# Patient Record
Sex: FEMALE | Race: WHITE | Employment: FULL TIME | URBAN - METROPOLITAN AREA
[De-identification: names, ages, dates, MRNs, and addresses within clinical notes are randomized per-mention and may not be internally consistent; named-entity substitution may affect disease eponyms.]

---

## 2017-01-19 ENCOUNTER — OFFICE VISIT (OUTPATIENT)
Dept: FAMILY MEDICINE CLINIC | Facility: CLINIC | Age: 57
End: 2017-01-19

## 2017-01-19 ENCOUNTER — LAB ENCOUNTER (OUTPATIENT)
Dept: LAB | Age: 57
End: 2017-01-19
Attending: FAMILY MEDICINE
Payer: COMMERCIAL

## 2017-01-19 VITALS
TEMPERATURE: 99 F | HEIGHT: 64 IN | SYSTOLIC BLOOD PRESSURE: 112 MMHG | WEIGHT: 166 LBS | HEART RATE: 64 BPM | DIASTOLIC BLOOD PRESSURE: 66 MMHG | BODY MASS INDEX: 28.34 KG/M2 | RESPIRATION RATE: 18 BRPM

## 2017-01-19 DIAGNOSIS — Z01.419 WELL WOMAN EXAM WITH ROUTINE GYNECOLOGICAL EXAM: Primary | ICD-10-CM

## 2017-01-19 DIAGNOSIS — Z12.11 SCREENING FOR COLON CANCER: ICD-10-CM

## 2017-01-19 DIAGNOSIS — Z12.4 SCREENING FOR MALIGNANT NEOPLASM OF CERVIX: ICD-10-CM

## 2017-01-19 DIAGNOSIS — L98.9 FACIAL LESION: ICD-10-CM

## 2017-01-19 DIAGNOSIS — Z12.31 VISIT FOR SCREENING MAMMOGRAM: ICD-10-CM

## 2017-01-19 LAB
25-HYDROXYVITAMIN D (TOTAL): 23.7 NG/ML (ref 30–100)
ALBUMIN SERPL-MCNC: 3.8 G/DL (ref 3.5–4.8)
ALP LIVER SERPL-CCNC: 66 U/L (ref 46–118)
ALT SERPL-CCNC: 25 U/L (ref 14–54)
AST SERPL-CCNC: 16 U/L (ref 15–41)
BASOPHILS # BLD AUTO: 0.03 X10(3) UL (ref 0–0.1)
BASOPHILS NFR BLD AUTO: 0.4 %
BILIRUB SERPL-MCNC: 0.6 MG/DL (ref 0.1–2)
BILIRUB UR QL STRIP.AUTO: NEGATIVE
BUN BLD-MCNC: 17 MG/DL (ref 8–20)
CALCIUM BLD-MCNC: 9.2 MG/DL (ref 8.3–10.3)
CHLORIDE: 107 MMOL/L (ref 101–111)
CHOLEST SMN-MCNC: 205 MG/DL (ref ?–200)
CLARITY UR REFRACT.AUTO: CLEAR
CO2: 27 MMOL/L (ref 22–32)
COLOR UR AUTO: YELLOW
CREAT BLD-MCNC: 0.59 MG/DL (ref 0.55–1.02)
EOSINOPHIL # BLD AUTO: 0.19 X10(3) UL (ref 0–0.3)
EOSINOPHIL NFR BLD AUTO: 2.5 %
ERYTHROCYTE [DISTWIDTH] IN BLOOD BY AUTOMATED COUNT: 12 % (ref 11.5–16)
GLUCOSE BLD-MCNC: 89 MG/DL (ref 70–99)
GLUCOSE UR STRIP.AUTO-MCNC: NEGATIVE MG/DL
HCT VFR BLD AUTO: 40.6 % (ref 34–50)
HDLC SERPL-MCNC: 86 MG/DL (ref 45–?)
HDLC SERPL: 2.38 {RATIO} (ref ?–4.44)
HGB BLD-MCNC: 13.5 G/DL (ref 12–16)
IMMATURE GRANULOCYTE COUNT: 0.02 X10(3) UL (ref 0–1)
IMMATURE GRANULOCYTE RATIO %: 0.3 %
KETONES UR STRIP.AUTO-MCNC: NEGATIVE MG/DL
LDLC SERPL CALC-MCNC: 101 MG/DL (ref ?–130)
LEUKOCYTE ESTERASE UR QL STRIP.AUTO: NEGATIVE
LYMPHOCYTES # BLD AUTO: 2.17 X10(3) UL (ref 0.9–4)
LYMPHOCYTES NFR BLD AUTO: 29.1 %
M PROTEIN MFR SERPL ELPH: 7.6 G/DL (ref 6.1–8.3)
MCH RBC QN AUTO: 31.3 PG (ref 27–33.2)
MCHC RBC AUTO-ENTMCNC: 33.3 G/DL (ref 31–37)
MCV RBC AUTO: 94.2 FL (ref 81–100)
MONOCYTES # BLD AUTO: 0.67 X10(3) UL (ref 0.1–0.6)
MONOCYTES NFR BLD AUTO: 9 %
NEUTROPHIL ABS PRELIM: 4.38 X10 (3) UL (ref 1.3–6.7)
NEUTROPHILS # BLD AUTO: 4.38 X10(3) UL (ref 1.3–6.7)
NEUTROPHILS NFR BLD AUTO: 58.7 %
NITRITE UR QL STRIP.AUTO: NEGATIVE
NONHDLC SERPL-MCNC: 119 MG/DL (ref ?–130)
PH UR STRIP.AUTO: 6 [PH] (ref 4.5–8)
PLATELET # BLD AUTO: 227 10(3)UL (ref 150–450)
POTASSIUM SERPL-SCNC: 4.2 MMOL/L (ref 3.6–5.1)
PROT UR STRIP.AUTO-MCNC: NEGATIVE MG/DL
RBC # BLD AUTO: 4.31 X10(6)UL (ref 3.8–5.1)
RBC UR QL AUTO: NEGATIVE
RED CELL DISTRIBUTION WIDTH-SD: 42.2 FL (ref 35.1–46.3)
SODIUM SERPL-SCNC: 140 MMOL/L (ref 136–144)
SP GR UR STRIP.AUTO: 1.02 (ref 1–1.03)
TRIGLYCERIDES: 89 MG/DL (ref ?–150)
TSI SER-ACNC: 0.47 MIU/ML (ref 0.35–5.5)
UROBILINOGEN UR STRIP.AUTO-MCNC: <2 MG/DL
VLDL: 18 MG/DL (ref 5–40)
WBC # BLD AUTO: 7.5 X10(3) UL (ref 4–13)

## 2017-01-19 PROCEDURE — 81003 URINALYSIS AUTO W/O SCOPE: CPT

## 2017-01-19 PROCEDURE — 80053 COMPREHEN METABOLIC PANEL: CPT

## 2017-01-19 PROCEDURE — 88175 CYTOPATH C/V AUTO FLUID REDO: CPT | Performed by: FAMILY MEDICINE

## 2017-01-19 PROCEDURE — 36415 COLL VENOUS BLD VENIPUNCTURE: CPT

## 2017-01-19 PROCEDURE — 80061 LIPID PANEL: CPT

## 2017-01-19 PROCEDURE — 99396 PREV VISIT EST AGE 40-64: CPT | Performed by: FAMILY MEDICINE

## 2017-01-19 PROCEDURE — 85025 COMPLETE CBC W/AUTO DIFF WBC: CPT

## 2017-01-19 PROCEDURE — 82306 VITAMIN D 25 HYDROXY: CPT

## 2017-01-19 PROCEDURE — 87624 HPV HI-RISK TYP POOLED RSLT: CPT | Performed by: FAMILY MEDICINE

## 2017-01-19 PROCEDURE — 84443 ASSAY THYROID STIM HORMONE: CPT

## 2017-01-19 NOTE — PROGRESS NOTES
Neo Serra is a 64year old female who presents for a complete physical exam.   HPI:     Patient presents with:  Physical      Patient feels well, dental visit up to date, no hearing problem. Vaccinations- up to date.  Needs mammogram.    Per heartburn  GENITAL/: no dysuria, urgency or frequency  MUSCULOSKELETAL: no joint complaints upper or lower extremities  NEURO: no sensory or motor complaint  HEMATOLOGY: denies hx anemia; denies bruising or excessive bleeding  ENDOCRINE: denies excessive Overweight: Discussed BMI target. Discussed calorie counting, diet, exercise, and one pound of weight loss per week by decreasing calorie intake by 500 calories per day. Handouts given. Patient understood above plan and agreed.     Orders Placed This [place]

## 2017-01-19 NOTE — PATIENT INSTRUCTIONS
[facility] General Surgery:       Dr. Verito Vogt    [address]  [facility]  [place], [address]      [address], [address]  [facility]    [id number] [place] [address]

## 2017-01-25 LAB — HPV I/H RISK 1 DNA SPEC QL NAA+PROBE: NEGATIVE

## 2017-01-30 ENCOUNTER — HOSPITAL ENCOUNTER (OUTPATIENT)
Dept: MAMMOGRAPHY | Age: 57
Discharge: HOME | End: 2017-01-30
Attending: FAMILY MEDICINE
Payer: COMMERCIAL

## 2017-01-30 DIAGNOSIS — Z12.31 VISIT FOR SCREENING MAMMOGRAM: ICD-10-CM

## 2017-01-30 PROCEDURE — 77067 SCR MAMMO BI INCL CAD: CPT

## 2017-04-13 ENCOUNTER — OFFICE VISIT (OUTPATIENT)
Dept: SURGERY | Facility: CLINIC | Age: 57
End: 2017-04-13

## 2017-04-13 VITALS
HEART RATE: 77 BPM | SYSTOLIC BLOOD PRESSURE: 118 MMHG | DIASTOLIC BLOOD PRESSURE: 76 MMHG | TEMPERATURE: 99 F | HEIGHT: 64 IN | BODY MASS INDEX: 28.34 KG/M2 | WEIGHT: 166 LBS

## 2017-04-13 DIAGNOSIS — R12 CHRONIC HEARTBURN: ICD-10-CM

## 2017-04-13 DIAGNOSIS — Z12.11 ENCOUNTER FOR SCREENING COLONOSCOPY: Primary | ICD-10-CM

## 2017-04-13 PROCEDURE — 99243 OFF/OP CNSLTJ NEW/EST LOW 30: CPT | Performed by: SURGERY

## 2017-04-13 RX ORDER — POLYETHYLENE GLYCOL 3350, SODIUM CHLORIDE, SODIUM BICARBONATE, POTASSIUM CHLORIDE 420; 11.2; 5.72; 1.48 G/4L; G/4L; G/4L; G/4L
POWDER, FOR SOLUTION ORAL
Qty: 1 BOTTLE | Refills: 0 | Status: SHIPPED | OUTPATIENT
Start: 2017-04-13 | End: 2017-05-11

## 2017-04-13 NOTE — H&P
New Patient Visit Note       Active Problems      1. Encounter for screening colonoscopy    2.  Chronic heartburn        Chief Complaint   Patient presents with:  Colonoscopy: [facility] PT ref by Dr Agustin Grewal for c-scope consult, never had one, denies family hx of Kaye Cabrera Mother    • osteoporosis[other] Ribeiro Марина Mother    • Other[other] [OTHER] Maternal Grandmother    • osteoporosis[other] [OTHER] Maternal Grandmother      Social History    Marital Status:              Spouse Name: 118/76 mmHg  Pulse 77  Temp(Src) 98.5 °F (36.9 °C)  Ht 64\"  Wt 166 lb  BMI 28.48 kg/m2  Physical Exam   Constitutional: She is oriented to person, place, and time. She appears well-developed and well-nourished. No distress.    HENT:   Head: Normocephalic a oriented to person, place, and time. Skin: Skin is warm and dry. Psychiatric: She has a normal mood and affect. Her speech is normal and behavior is normal. Judgment and thought content normal.   Nursing note and vitals reviewed.           Assessment

## 2017-04-28 PROCEDURE — 87081 CULTURE SCREEN ONLY: CPT | Performed by: SURGERY

## 2017-04-29 ENCOUNTER — LAB REQUISITION (OUTPATIENT)
Dept: LAB | Facility: HOSPITAL | Age: 57
End: 2017-04-29
Attending: SURGERY
Payer: COMMERCIAL

## 2017-04-29 DIAGNOSIS — K21.9 GASTRO-ESOPHAGEAL REFLUX DISEASE WITHOUT ESOPHAGITIS: ICD-10-CM

## 2017-05-11 ENCOUNTER — OFFICE VISIT (OUTPATIENT)
Dept: SURGERY | Facility: CLINIC | Age: 57
End: 2017-05-11

## 2017-05-11 VITALS — BODY MASS INDEX: 28.51 KG/M2 | WEIGHT: 167 LBS | HEIGHT: 64 IN

## 2017-05-11 DIAGNOSIS — R12 CHRONIC HEARTBURN: Primary | ICD-10-CM

## 2017-05-11 DIAGNOSIS — K31.A0 INTESTINAL METAPLASIA OF GASTRIC CARDIA: ICD-10-CM

## 2017-05-11 PROCEDURE — 99212 OFFICE O/P EST SF 10 MIN: CPT | Performed by: SURGERY

## 2017-05-11 NOTE — PROGRESS NOTES
Post Operative Visit Note       Active Problems  1. Chronic heartburn    2. Intestinal metaplasia of gastric cardia         Chief Complaint   Patient presents with:  Colonoscopy: Est. Pt F/U Colonoscopy and EGD on 4/28, review Pathology.           History o children:               Social History Main Topics    Smoking Status: Former Smoker                   Packs/Day: 0.00  Years:         Smokeless Status: Never Used                        Comment: quit 1`0 yrs ago    Alcohol Use: No              Drug Use: No respiratory distress. She has no decreased breath sounds. She has no rhonchi. Abdominal: Soft. Normal appearance.  She exhibits no shifting dullness, no distension, no pulsatile liver, no fluid wave, no abdominal bruit, no ascites, no pulsatile midline ma long-standing hiatal hernia along with chronic suboptimally controlled reflux symptoms. · The patient may benefit from repair of hiatal hernia and robotic Nissen fundoplication.   · The patient may also benefit from endoscopic treatment of her mucosal abno

## 2017-06-01 ENCOUNTER — TELEPHONE (OUTPATIENT)
Dept: FAMILY MEDICINE CLINIC | Facility: CLINIC | Age: 57
End: 2017-06-01

## 2017-06-01 DIAGNOSIS — R12 CHRONIC HEARTBURN: Primary | ICD-10-CM

## 2017-06-01 NOTE — TELEPHONE ENCOUNTER
Referral placed in Epic for patient as requested. Patient notified of this information via My Chart.

## 2017-06-01 NOTE — TELEPHONE ENCOUNTER
[facility]    [address]  [address]  [address]  [place], [address]    [address]   [place],

## 2017-06-01 NOTE — TELEPHONE ENCOUNTER
Please see above. Dr. Javier Fernandez recommended patient f/u with Dr. Norma Martinez. We have never referred patient before. Ana María Walker for referral? Please advise. Thank you!

## 2017-08-14 PROCEDURE — 88341 IMHCHEM/IMCYTCHM EA ADD ANTB: CPT | Performed by: INTERNAL MEDICINE

## 2017-08-14 PROCEDURE — 88342 IMHCHEM/IMCYTCHM 1ST ANTB: CPT | Performed by: INTERNAL MEDICINE

## 2017-08-14 PROCEDURE — 88365 INSITU HYBRIDIZATION (FISH): CPT | Performed by: INTERNAL MEDICINE

## 2017-08-14 PROCEDURE — 88305 TISSUE EXAM BY PATHOLOGIST: CPT | Performed by: INTERNAL MEDICINE

## 2018-04-12 ENCOUNTER — HOSPITAL ENCOUNTER (OUTPATIENT)
Age: 58
Discharge: HOME | End: 2018-04-12
Payer: COMMERCIAL

## 2018-04-12 VITALS
TEMPERATURE: 98 F | HEART RATE: 88 BPM | RESPIRATION RATE: 18 BRPM | SYSTOLIC BLOOD PRESSURE: 152 MMHG | OXYGEN SATURATION: 100 % | DIASTOLIC BLOOD PRESSURE: 92 MMHG

## 2018-04-12 DIAGNOSIS — T14.8XXA SKIN AVULSION: Primary | ICD-10-CM

## 2018-04-12 PROCEDURE — 99213 OFFICE O/P EST LOW 20 MIN: CPT

## 2018-04-12 PROCEDURE — 99203 OFFICE O/P NEW LOW 30 MIN: CPT

## 2018-04-12 NOTE — ED PROVIDER NOTES
Patient [facility]en in: [facility]    History   Patient presents with:  Laceration Abrasion (integumentary)    Stated Complaint: laceration right thumb today    HPI    Patient is a pleasant 40-year-old female.   She is right-hand domina 3 cm avulsion laceration to the lateral aspect of the distal right thumb. No nail involvement. No tendon involvement. Full range of motion. Intact sensation and normal cap refill.   Neuro: Cranial nerves intact, Normal Gait    ED Course   Labs Reviewe

## 2018-04-26 ENCOUNTER — OFFICE VISIT (OUTPATIENT)
Dept: FAMILY MEDICINE CLINIC | Facility: CLINIC | Age: 58
End: 2018-04-26

## 2018-04-26 VITALS
WEIGHT: 177 LBS | HEIGHT: 64 IN | HEART RATE: 80 BPM | SYSTOLIC BLOOD PRESSURE: 130 MMHG | BODY MASS INDEX: 30.22 KG/M2 | DIASTOLIC BLOOD PRESSURE: 84 MMHG | TEMPERATURE: 98 F | RESPIRATION RATE: 18 BRPM

## 2018-04-26 DIAGNOSIS — Z12.39 SCREENING FOR BREAST CANCER: ICD-10-CM

## 2018-04-26 DIAGNOSIS — Z13.89 SCREENING FOR GENITOURINARY CONDITION: ICD-10-CM

## 2018-04-26 DIAGNOSIS — Z00.00 ROUTINE GENERAL MEDICAL EXAMINATION AT A HEALTH CARE FACILITY: Primary | ICD-10-CM

## 2018-04-26 PROCEDURE — 90471 IMMUNIZATION ADMIN: CPT | Performed by: FAMILY MEDICINE

## 2018-04-26 PROCEDURE — 99396 PREV VISIT EST AGE 40-64: CPT | Performed by: FAMILY MEDICINE

## 2018-04-26 PROCEDURE — 90632 HEPA VACCINE ADULT IM: CPT | Performed by: FAMILY MEDICINE

## 2018-04-26 NOTE — PROGRESS NOTES
HPI:   Roque Clinton is a 62year old female who presents for a complete physical exam. Symptoms: denies discharge, itching, burning or dysuria. Patient complains of having heartburn. She was diagnosed with Kwok's esophagus.   Overall she is doing oka • Depressive disorder, not elsewhere classified    • Normal delivery       Past Surgical History:  1995: CYST ASPIRATION LEFT      Comment: breast  1/1/84: TONSILLECTOMY  08/2017: UPPER GI ENDOSCOPY,BIOPSY      Comment: Kwok's; HH- confirmed without tenderness  BREAST: no dominant or suspicious mass no supraclavicular no axillary lymphadenopathy. ,   LUNGS: clear to auscultation  CARDIO: RRR without murmur  GI: good BS's,no masses, HSM or tenderness  : deferred by patient was done last year.   RECTAL:

## 2018-07-23 ENCOUNTER — HOSPITAL ENCOUNTER (OUTPATIENT)
Dept: MAMMOGRAPHY | Age: 58
Discharge: HOME | End: 2018-07-23
Attending: FAMILY MEDICINE
Payer: COMMERCIAL

## 2018-07-23 ENCOUNTER — LAB ENCOUNTER (OUTPATIENT)
Dept: LAB | Age: 58
End: 2018-07-23
Attending: FAMILY MEDICINE
Payer: COMMERCIAL

## 2018-07-23 DIAGNOSIS — Z13.89 SCREENING FOR GENITOURINARY CONDITION: ICD-10-CM

## 2018-07-23 DIAGNOSIS — Z12.39 SCREENING FOR BREAST CANCER: ICD-10-CM

## 2018-07-23 DIAGNOSIS — Z00.00 ROUTINE GENERAL MEDICAL EXAMINATION AT A HEALTH CARE FACILITY: ICD-10-CM

## 2018-07-23 LAB
ALBUMIN SERPL-MCNC: 3.4 G/DL (ref 3.5–4.8)
ALBUMIN/GLOB SERPL: 0.8 {RATIO} (ref 1–2)
ALP LIVER SERPL-CCNC: 76 U/L (ref 46–118)
ALT SERPL-CCNC: 24 U/L (ref 14–54)
ANION GAP SERPL CALC-SCNC: 8 MMOL/L (ref 0–18)
AST SERPL-CCNC: 15 U/L (ref 15–41)
BASOPHILS # BLD AUTO: 0.02 X10(3) UL (ref 0–0.1)
BASOPHILS NFR BLD AUTO: 0.3 %
BILIRUB SERPL-MCNC: 0.4 MG/DL (ref 0.1–2)
BILIRUB UR QL STRIP.AUTO: NEGATIVE
BUN BLD-MCNC: 17 MG/DL (ref 8–20)
BUN/CREAT SERPL: 29.3 (ref 10–20)
CALCIUM BLD-MCNC: 9.4 MG/DL (ref 8.3–10.3)
CHLORIDE SERPL-SCNC: 108 MMOL/L (ref 101–111)
CHOLEST SMN-MCNC: 177 MG/DL (ref ?–200)
CLARITY UR REFRACT.AUTO: CLEAR
CO2 SERPL-SCNC: 26 MMOL/L (ref 22–32)
COLOR UR AUTO: YELLOW
CREAT BLD-MCNC: 0.58 MG/DL (ref 0.55–1.02)
EOSINOPHIL # BLD AUTO: 0.21 X10(3) UL (ref 0–0.3)
EOSINOPHIL NFR BLD AUTO: 3.5 %
ERYTHROCYTE [DISTWIDTH] IN BLOOD BY AUTOMATED COUNT: 12 % (ref 11.5–16)
GLOBULIN PLAS-MCNC: 4.1 G/DL (ref 2.5–3.7)
GLUCOSE BLD-MCNC: 80 MG/DL (ref 70–99)
GLUCOSE UR STRIP.AUTO-MCNC: NEGATIVE MG/DL
HCT VFR BLD AUTO: 40.8 % (ref 34–50)
HDLC SERPL-MCNC: 65 MG/DL (ref 40–59)
HGB BLD-MCNC: 13.4 G/DL (ref 12–16)
IMMATURE GRANULOCYTE COUNT: 0.01 X10(3) UL (ref 0–1)
IMMATURE GRANULOCYTE RATIO %: 0.2 %
KETONES UR STRIP.AUTO-MCNC: NEGATIVE MG/DL
LDLC SERPL CALC-MCNC: 95 MG/DL (ref ?–100)
LEUKOCYTE ESTERASE UR QL STRIP.AUTO: NEGATIVE
LYMPHOCYTES # BLD AUTO: 2.24 X10(3) UL (ref 0.9–4)
LYMPHOCYTES NFR BLD AUTO: 37 %
M PROTEIN MFR SERPL ELPH: 7.5 G/DL (ref 6.1–8.3)
MCH RBC QN AUTO: 30.8 PG (ref 27–33.2)
MCHC RBC AUTO-ENTMCNC: 32.8 G/DL (ref 31–37)
MCV RBC AUTO: 93.8 FL (ref 81–100)
MONOCYTES # BLD AUTO: 0.59 X10(3) UL (ref 0.1–1)
MONOCYTES NFR BLD AUTO: 9.8 %
NEUTROPHIL ABS PRELIM: 2.98 X10 (3) UL (ref 1.3–6.7)
NEUTROPHILS # BLD AUTO: 2.98 X10(3) UL (ref 1.3–6.7)
NEUTROPHILS NFR BLD AUTO: 49.2 %
NITRITE UR QL STRIP.AUTO: NEGATIVE
NONHDLC SERPL-MCNC: 112 MG/DL (ref ?–130)
OSMOLALITY SERPL CALC.SUM OF ELEC: 295 MOSM/KG (ref 275–295)
PH UR STRIP.AUTO: 6 [PH] (ref 4.5–8)
PLATELET # BLD AUTO: 220 10(3)UL (ref 150–450)
POTASSIUM SERPL-SCNC: 4.3 MMOL/L (ref 3.6–5.1)
PROT UR STRIP.AUTO-MCNC: NEGATIVE MG/DL
RBC # BLD AUTO: 4.35 X10(6)UL (ref 3.8–5.1)
RBC UR QL AUTO: NEGATIVE
RED CELL DISTRIBUTION WIDTH-SD: 41.7 FL (ref 35.1–46.3)
SODIUM SERPL-SCNC: 142 MMOL/L (ref 136–144)
SP GR UR STRIP.AUTO: 1.02 (ref 1–1.03)
TRIGL SERPL-MCNC: 85 MG/DL (ref 30–149)
TSI SER-ACNC: 0.53 MIU/ML (ref 0.35–5.5)
UROBILINOGEN UR STRIP.AUTO-MCNC: <2 MG/DL
VIT D+METAB SERPL-MCNC: 16.1 NG/ML (ref 30–100)
VLDLC SERPL CALC-MCNC: 17 MG/DL (ref 0–30)
WBC # BLD AUTO: 6.1 X10(3) UL (ref 4–13)

## 2018-07-23 PROCEDURE — 80053 COMPREHEN METABOLIC PANEL: CPT

## 2018-07-23 PROCEDURE — 84443 ASSAY THYROID STIM HORMONE: CPT

## 2018-07-23 PROCEDURE — 77063 BREAST TOMOSYNTHESIS BI: CPT | Performed by: FAMILY MEDICINE

## 2018-07-23 PROCEDURE — 36415 COLL VENOUS BLD VENIPUNCTURE: CPT

## 2018-07-23 PROCEDURE — 77067 SCR MAMMO BI INCL CAD: CPT | Performed by: FAMILY MEDICINE

## 2018-07-23 PROCEDURE — 85025 COMPLETE CBC W/AUTO DIFF WBC: CPT

## 2018-07-23 PROCEDURE — 81003 URINALYSIS AUTO W/O SCOPE: CPT

## 2018-07-23 PROCEDURE — 82306 VITAMIN D 25 HYDROXY: CPT

## 2018-07-23 PROCEDURE — 80061 LIPID PANEL: CPT

## 2018-07-24 ENCOUNTER — TELEPHONE (OUTPATIENT)
Dept: FAMILY MEDICINE CLINIC | Facility: CLINIC | Age: 58
End: 2018-07-24

## 2018-07-24 DIAGNOSIS — R77.1 ELEVATED SERUM GLOBULIN LEVEL: ICD-10-CM

## 2018-07-24 DIAGNOSIS — R79.89 LOW VITAMIN D LEVEL: Primary | ICD-10-CM

## 2018-07-24 RX ORDER — ERGOCALCIFEROL 1.25 MG/1
50000 CAPSULE ORAL WEEKLY
Qty: 12 CAPSULE | Refills: 0 | Status: SHIPPED | OUTPATIENT
Start: 2018-07-24 | End: 2018-10-19 | Stop reason: ALTCHOICE

## 2018-07-24 NOTE — TELEPHONE ENCOUNTER
Pt called and advised that she saw her test results on mychart. She did not want to discuss them with a nurse, rather with Dr. Sena Maradiaga. I advised her that I would put in a message to see what her next step is based on her test results.

## 2018-10-01 ENCOUNTER — TELEPHONE (OUTPATIENT)
Dept: FAMILY MEDICINE CLINIC | Facility: CLINIC | Age: 58
End: 2018-10-01

## 2018-10-01 NOTE — TELEPHONE ENCOUNTER
Pt returned call. She did cancel through [employer]. There were no notes from [employer] on JEREMIAS today for any patient.

## 2018-10-01 NOTE — TELEPHONE ENCOUNTER
I reached out to pt and left a voice mail.   I stated that due to her no show she will be charged $25.

## 2018-10-10 ENCOUNTER — APPOINTMENT (OUTPATIENT)
Dept: LAB | Age: 58
End: 2018-10-10
Attending: FAMILY MEDICINE
Payer: COMMERCIAL

## 2018-10-10 DIAGNOSIS — R79.89 LOW VITAMIN D LEVEL: ICD-10-CM

## 2018-10-10 DIAGNOSIS — R77.1 ELEVATED SERUM GLOBULIN LEVEL: ICD-10-CM

## 2018-10-10 PROCEDURE — 36415 COLL VENOUS BLD VENIPUNCTURE: CPT

## 2018-10-10 PROCEDURE — 82306 VITAMIN D 25 HYDROXY: CPT

## 2018-10-10 PROCEDURE — 80053 COMPREHEN METABOLIC PANEL: CPT

## 2018-10-19 ENCOUNTER — OFFICE VISIT (OUTPATIENT)
Dept: FAMILY MEDICINE CLINIC | Facility: CLINIC | Age: 58
End: 2018-10-19

## 2018-10-19 VITALS
WEIGHT: 177 LBS | RESPIRATION RATE: 16 BRPM | DIASTOLIC BLOOD PRESSURE: 80 MMHG | HEART RATE: 78 BPM | SYSTOLIC BLOOD PRESSURE: 110 MMHG | TEMPERATURE: 98 F | BODY MASS INDEX: 30.22 KG/M2 | HEIGHT: 64 IN

## 2018-10-19 DIAGNOSIS — E55.9 VITAMIN D DEFICIENCY: ICD-10-CM

## 2018-10-19 DIAGNOSIS — E66.9 OBESITY (BMI 30.0-34.9): ICD-10-CM

## 2018-10-19 DIAGNOSIS — R12 CHRONIC HEARTBURN: Primary | ICD-10-CM

## 2018-10-19 PROCEDURE — 99213 OFFICE O/P EST LOW 20 MIN: CPT | Performed by: FAMILY MEDICINE

## 2018-10-19 NOTE — PROGRESS NOTES
Sakshi Self is a 62year old female. cc discuss test results, vitamin D deficiency, heartburn,  HPI:   Patient is coming to the office to discuss test results. She had a blood work done showed slightly elevated BUN.   At this time albumin and globulin l GENERAL: well developed, well nourished,in no apparent distress, obese  SKIN: no rashes,no suspicious lesions  HEENT: atraumatic, normocephalic,ears and throat are clear  NECK: supple,no adenopathy,  LUNGS: clear to auscultation  CARDIO: RRR without murm physical.

## 2018-10-19 NOTE — PATIENT INSTRUCTIONS
Can you vitamin D3 4000 units daily. Will monitor the levels. Healthy diet. Limit carbohydrates , try to lose some weight. Elevate head of the bed.

## 2018-10-22 ENCOUNTER — TELEPHONE (OUTPATIENT)
Dept: FAMILY MEDICINE CLINIC | Facility: CLINIC | Age: 58
End: 2018-10-22

## 2018-10-22 DIAGNOSIS — H43.392 FLOATERS IN VISUAL FIELD, LEFT: Primary | ICD-10-CM

## 2018-10-22 NOTE — TELEPHONE ENCOUNTER
I spoke with pt. She has 2 black spots that are floating in her left eye. Now there is a cloudy area that has appeared. She has had this for 3 days. She denies any pain. I spoke with Kate Geronimo at Dr. Aislinn Morse office. They will see her today at 2:15.  Referral was

## 2018-10-22 NOTE — TELEPHONE ENCOUNTER
1. What are your symptoms? Feels like something is floating in her left eye, two spots. 2. How long have you been having these symptoms? 3 days        3. Have you done anything already to treat your symptoms?   No        ADDITIONAL INFO:   Pt can s

## 2019-06-21 ENCOUNTER — PATIENT MESSAGE (OUTPATIENT)
Dept: FAMILY MEDICINE CLINIC | Facility: CLINIC | Age: 59
End: 2019-06-21

## 2019-06-22 NOTE — TELEPHONE ENCOUNTER
From: Alexander Maria  To: Carleen Sanchez MD  Sent: 6/21/2019 7:29 PM CDT  Subject: Referral Tonja Anderson    I left voice message at the office.  I would like to see Counselor Owen Krause, have an appointment for Wednesday, July 10, a

## 2019-06-22 NOTE — TELEPHONE ENCOUNTER
Patient does not need to have referral to see counselor. It is coming from the behavioral health portion of her insurance.   Thank you

## 2019-07-24 ENCOUNTER — TELEPHONE (OUTPATIENT)
Dept: FAMILY MEDICINE CLINIC | Facility: CLINIC | Age: 59
End: 2019-07-24

## 2019-07-24 DIAGNOSIS — Z12.31 ENCOUNTER FOR SCREENING MAMMOGRAM FOR MALIGNANT NEOPLASM OF BREAST: Primary | ICD-10-CM

## 2019-07-24 NOTE — TELEPHONE ENCOUNTER
Pt called. She states that she's overdue for a mammogram. Last one was on 7/23/18. Please order a screening mammogram for her. Let her know when the mammogram has been ordered. Number was verified with pt.

## 2019-07-29 ENCOUNTER — HOSPITAL ENCOUNTER (OUTPATIENT)
Dept: MAMMOGRAPHY | Age: 59
Discharge: HOME | End: 2019-07-29
Attending: FAMILY MEDICINE
Payer: COMMERCIAL

## 2019-07-29 DIAGNOSIS — Z12.31 ENCOUNTER FOR SCREENING MAMMOGRAM FOR MALIGNANT NEOPLASM OF BREAST: ICD-10-CM

## 2019-07-29 PROCEDURE — 77063 BREAST TOMOSYNTHESIS BI: CPT | Performed by: FAMILY MEDICINE

## 2019-07-29 PROCEDURE — 77067 SCR MAMMO BI INCL CAD: CPT | Performed by: FAMILY MEDICINE

## 2019-08-05 ENCOUNTER — OFFICE VISIT (OUTPATIENT)
Dept: FAMILY MEDICINE CLINIC | Facility: CLINIC | Age: 59
End: 2019-08-05

## 2019-08-05 VITALS
SYSTOLIC BLOOD PRESSURE: 124 MMHG | OXYGEN SATURATION: 98 % | WEIGHT: 175 LBS | HEART RATE: 72 BPM | RESPIRATION RATE: 16 BRPM | BODY MASS INDEX: 29.88 KG/M2 | DIASTOLIC BLOOD PRESSURE: 80 MMHG | HEIGHT: 64 IN

## 2019-08-05 DIAGNOSIS — Z86.19 H/O TRAVELER'S DIARRHEA: ICD-10-CM

## 2019-08-05 DIAGNOSIS — Z13.21 ENCOUNTER FOR VITAMIN DEFICIENCY SCREENING: ICD-10-CM

## 2019-08-05 DIAGNOSIS — Z13.29 SCREENING FOR ENDOCRINE, NUTRITIONAL, METABOLIC AND IMMUNITY DISORDER: ICD-10-CM

## 2019-08-05 DIAGNOSIS — Z13.228 SCREENING FOR ENDOCRINE, NUTRITIONAL, METABOLIC AND IMMUNITY DISORDER: ICD-10-CM

## 2019-08-05 DIAGNOSIS — Z13.6 ENCOUNTER FOR LIPID SCREENING FOR CARDIOVASCULAR DISEASE: ICD-10-CM

## 2019-08-05 DIAGNOSIS — Z23 NEED FOR HEPATITIS A IMMUNIZATION: ICD-10-CM

## 2019-08-05 DIAGNOSIS — Z13.0 SCREENING FOR ENDOCRINE, NUTRITIONAL, METABOLIC AND IMMUNITY DISORDER: ICD-10-CM

## 2019-08-05 DIAGNOSIS — Z13.21 SCREENING FOR ENDOCRINE, NUTRITIONAL, METABOLIC AND IMMUNITY DISORDER: ICD-10-CM

## 2019-08-05 DIAGNOSIS — Z00.00 ROUTINE GENERAL MEDICAL EXAMINATION AT A HEALTH CARE FACILITY: Primary | ICD-10-CM

## 2019-08-05 DIAGNOSIS — Z13.220 ENCOUNTER FOR LIPID SCREENING FOR CARDIOVASCULAR DISEASE: ICD-10-CM

## 2019-08-05 PROCEDURE — 90471 IMMUNIZATION ADMIN: CPT | Performed by: NURSE PRACTITIONER

## 2019-08-05 PROCEDURE — 99386 PREV VISIT NEW AGE 40-64: CPT | Performed by: NURSE PRACTITIONER

## 2019-08-05 PROCEDURE — 90632 HEPA VACCINE ADULT IM: CPT | Performed by: NURSE PRACTITIONER

## 2019-08-05 RX ORDER — AZITHROMYCIN 500 MG/1
500 TABLET, FILM COATED ORAL DAILY
Qty: 5 TABLET | Refills: 0 | Status: SHIPPED | OUTPATIENT
Start: 2019-08-05 | End: 2019-08-10

## 2019-08-05 NOTE — PATIENT INSTRUCTIONS
Prevention Guidelines, Women Ages 48 to 59  Screening tests and vaccines are an important part of managing your health. A screening test is done to find possible disorders or diseases in people who don't have any symptoms.  The goal is to find a disease e Chlamydia Women at increased risk for infection At routine exams   Colorectal cancer All women in this age group Flexible sigmoidoscopy every 5 years, or colonoscopy every 10 years, or double-contrast barium enema every 5 years; yearly fecal occult blood t Hepatitis B Women at increased risk for infection – talk with your healthcare provider 3 doses over 6 months; second dose should be given 1 month after the first dose; the third dose should be given at least 2 months after the second dose and at least 4 mo Use of daily aspirin Women ages 54 and up in this age group who are at risk for cardiovascular health problems such as stroke When your risk is known   Use of tobacco and the health effects it can cause All women in this age group Every exam   [address]

## 2019-08-05 NOTE — PROGRESS NOTES
[facility]   PROGRESS NOTE  Chief Complaint:   Patient presents with:  Physical: NP physical      HPI:   This is a 62year old female coming in for physical.      Physical: Patient is 62 y female here for the physical. Travelling to [facility].  Denies of Onset   • Heart Disorder Father    • Alcohol and Other Disorders Associated Father    • Other (MI) Father    • Heart Disorder Maternal Grandmother    • Other (Other) Maternal Grandmother    • Other (osteoporosis) Maternal Grandmother    • Hypertension M Vital signs reviewed. Appears stated age, well groomed. Physical Exam:  GEN:  Patient is alert, awake and oriented, in no apparent distress.   HEENT:  Head:  Normocephalic, atraumatic Eyes: EOMI, PERRL, no scleral icterus, conjunctivae clear bilaterally, 04/26/2019    Patient/Caregiver Education: Patient/Caregiver Education: There are no barriers to learning. Medical education done. Outcome: Patient verbalizes understanding.  Patient is notified to call with any questions, complications, allergies, or wor

## 2019-08-20 ENCOUNTER — LAB ENCOUNTER (OUTPATIENT)
Dept: LAB | Age: 59
End: 2019-08-20
Attending: NURSE PRACTITIONER
Payer: COMMERCIAL

## 2019-08-20 DIAGNOSIS — Z13.228 SCREENING FOR ENDOCRINE, NUTRITIONAL, METABOLIC AND IMMUNITY DISORDER: ICD-10-CM

## 2019-08-20 DIAGNOSIS — Z13.6 ENCOUNTER FOR LIPID SCREENING FOR CARDIOVASCULAR DISEASE: ICD-10-CM

## 2019-08-20 DIAGNOSIS — Z13.21 SCREENING FOR ENDOCRINE, NUTRITIONAL, METABOLIC AND IMMUNITY DISORDER: ICD-10-CM

## 2019-08-20 DIAGNOSIS — Z13.0 SCREENING FOR ENDOCRINE, NUTRITIONAL, METABOLIC AND IMMUNITY DISORDER: ICD-10-CM

## 2019-08-20 DIAGNOSIS — Z13.21 ENCOUNTER FOR VITAMIN DEFICIENCY SCREENING: ICD-10-CM

## 2019-08-20 DIAGNOSIS — Z13.29 SCREENING FOR ENDOCRINE, NUTRITIONAL, METABOLIC AND IMMUNITY DISORDER: ICD-10-CM

## 2019-08-20 DIAGNOSIS — Z13.220 ENCOUNTER FOR LIPID SCREENING FOR CARDIOVASCULAR DISEASE: ICD-10-CM

## 2019-08-20 LAB
ALBUMIN SERPL-MCNC: 3.5 G/DL (ref 3.4–5)
ALBUMIN/GLOB SERPL: 0.8 {RATIO} (ref 1–2)
ALP LIVER SERPL-CCNC: 66 U/L (ref 46–118)
ALT SERPL-CCNC: 34 U/L (ref 13–56)
ANION GAP SERPL CALC-SCNC: 7 MMOL/L (ref 0–18)
AST SERPL-CCNC: 26 U/L (ref 15–37)
BASOPHILS # BLD AUTO: 0.03 X10(3) UL (ref 0–0.2)
BASOPHILS NFR BLD AUTO: 0.4 %
BILIRUB SERPL-MCNC: 0.6 MG/DL (ref 0.1–2)
BUN BLD-MCNC: 16 MG/DL (ref 7–18)
BUN/CREAT SERPL: 23.2 (ref 10–20)
CALCIUM BLD-MCNC: 9 MG/DL (ref 8.5–10.1)
CHLORIDE SERPL-SCNC: 108 MMOL/L (ref 98–112)
CHOLEST SMN-MCNC: 210 MG/DL (ref ?–200)
CO2 SERPL-SCNC: 26 MMOL/L (ref 21–32)
CREAT BLD-MCNC: 0.69 MG/DL (ref 0.55–1.02)
DEPRECATED RDW RBC AUTO: 41.2 FL (ref 35.1–46.3)
EOSINOPHIL # BLD AUTO: 0.17 X10(3) UL (ref 0–0.7)
EOSINOPHIL NFR BLD AUTO: 2.5 %
ERYTHROCYTE [DISTWIDTH] IN BLOOD BY AUTOMATED COUNT: 12 % (ref 11–15)
GLOBULIN PLAS-MCNC: 4.2 G/DL (ref 2.8–4.4)
GLUCOSE BLD-MCNC: 69 MG/DL (ref 70–99)
HCT VFR BLD AUTO: 43.1 % (ref 35–48)
HDLC SERPL-MCNC: 72 MG/DL (ref 40–59)
HGB BLD-MCNC: 14 G/DL (ref 12–16)
IMM GRANULOCYTES # BLD AUTO: 0.03 X10(3) UL (ref 0–1)
IMM GRANULOCYTES NFR BLD: 0.4 %
LDLC SERPL CALC-MCNC: 116 MG/DL (ref ?–100)
LYMPHOCYTES # BLD AUTO: 2.25 X10(3) UL (ref 1–4)
LYMPHOCYTES NFR BLD AUTO: 33.4 %
M PROTEIN MFR SERPL ELPH: 7.7 G/DL (ref 6.4–8.2)
MCH RBC QN AUTO: 30.5 PG (ref 26–34)
MCHC RBC AUTO-ENTMCNC: 32.5 G/DL (ref 31–37)
MCV RBC AUTO: 93.9 FL (ref 80–100)
MONOCYTES # BLD AUTO: 0.6 X10(3) UL (ref 0.1–1)
MONOCYTES NFR BLD AUTO: 8.9 %
NEUTROPHILS # BLD AUTO: 3.66 X10 (3) UL (ref 1.5–7.7)
NEUTROPHILS # BLD AUTO: 3.66 X10(3) UL (ref 1.5–7.7)
NEUTROPHILS NFR BLD AUTO: 54.4 %
NONHDLC SERPL-MCNC: 138 MG/DL (ref ?–130)
OSMOLALITY SERPL CALC.SUM OF ELEC: 292 MOSM/KG (ref 275–295)
PLATELET # BLD AUTO: 265 10(3)UL (ref 150–450)
POTASSIUM SERPL-SCNC: 4.2 MMOL/L (ref 3.5–5.1)
RBC # BLD AUTO: 4.59 X10(6)UL (ref 3.8–5.3)
SODIUM SERPL-SCNC: 141 MMOL/L (ref 136–145)
TRIGL SERPL-MCNC: 109 MG/DL (ref 30–149)
TSI SER-ACNC: 0.87 MIU/ML (ref 0.36–3.74)
VIT D+METAB SERPL-MCNC: 28 NG/ML (ref 30–100)
VLDLC SERPL CALC-MCNC: 22 MG/DL (ref 0–30)
WBC # BLD AUTO: 6.7 X10(3) UL (ref 4–11)

## 2019-08-20 PROCEDURE — 36415 COLL VENOUS BLD VENIPUNCTURE: CPT

## 2019-08-20 PROCEDURE — 82306 VITAMIN D 25 HYDROXY: CPT

## 2019-08-20 PROCEDURE — 80053 COMPREHEN METABOLIC PANEL: CPT

## 2019-08-20 PROCEDURE — 80061 LIPID PANEL: CPT

## 2019-08-20 PROCEDURE — 85025 COMPLETE CBC W/AUTO DIFF WBC: CPT

## 2019-08-20 PROCEDURE — 84443 ASSAY THYROID STIM HORMONE: CPT

## 2019-10-28 ENCOUNTER — TELEPHONE (OUTPATIENT)
Dept: FAMILY MEDICINE CLINIC | Facility: CLINIC | Age: 59
End: 2019-10-28

## 2019-10-28 DIAGNOSIS — H92.02 LEFT EAR PAIN: Primary | ICD-10-CM

## 2019-10-28 NOTE — TELEPHONE ENCOUNTER
Pt is requesting ENT referral for a clogged left ear. She saw Sharita Raymundo for this in the past.  [address] [place] [address] 8/5.   Please advise on ENT referral.

## 2019-10-28 NOTE — TELEPHONE ENCOUNTER
Reason: ENT Referral     Seen PCP for this: YES OR NO  Yes a while ago about 2 years ago     Seen this specialty before: YES OR NO  Yes about 2 years ago.  Patient can't remember who she was referred to last time     If yes, specialist name:     Patient danny

## 2019-10-28 NOTE — TELEPHONE ENCOUNTER
ENT group:      Dr. Barbara Castillo Listen    [address]  [place], [address]  [address] [id number],   [facility]      [phone].

## 2019-10-29 ENCOUNTER — OFFICE VISIT (OUTPATIENT)
Dept: FAMILY MEDICINE CLINIC | Facility: CLINIC | Age: 59
End: 2019-10-29

## 2019-10-29 VITALS
SYSTOLIC BLOOD PRESSURE: 110 MMHG | BODY MASS INDEX: 29.37 KG/M2 | DIASTOLIC BLOOD PRESSURE: 70 MMHG | RESPIRATION RATE: 16 BRPM | HEART RATE: 64 BPM | WEIGHT: 172 LBS | OXYGEN SATURATION: 98 % | HEIGHT: 64 IN

## 2019-10-29 DIAGNOSIS — H61.22 IMPACTED CERUMEN OF LEFT EAR: Primary | ICD-10-CM

## 2019-10-29 DIAGNOSIS — H69.82 ACUTE DYSFUNCTION OF LEFT EUSTACHIAN TUBE: ICD-10-CM

## 2019-10-29 PROCEDURE — 99213 OFFICE O/P EST LOW 20 MIN: CPT | Performed by: NURSE PRACTITIONER

## 2019-10-29 NOTE — TELEPHONE ENCOUNTER
Pt notified of below info. Referral placed in Epic. All questions answered, pt expresses understanding.

## 2019-10-29 NOTE — PATIENT INSTRUCTIONS
Impacted Earwax     Inner ear structures including ear canal and eardrum. Impacted earwax is a buildup of the natural wax in the ear (cerumen). Impacted earwax is very common. It can cause symptoms such as hearing loss.  It can also make it difficult Excess earwax usually does not cause any symptoms, unless there is a large amount of buildup.  Then it may cause symptoms such as:  · Hearing loss  · Earache  · Sense of ear fullness  · Itching in the ear  · Odor from the ear  · Ear drainage  · Dizziness  · © 8690-8750 [employer] [address] [facility], [facility] [place]. All rights reserved. This information is not intended as a substitute for professional medical care. Always follow your healthcare professional's instructions.

## 2019-10-29 NOTE — PROGRESS NOTES
Patient presents with:  Ear Pain: c/o left ear pain       Cesar Mendoza is a 61year old female who presents for  L ear  Fulness sensation. Problem she had for the  Past   1 week . Not worse or better, no sick contact. No ear discharge.  No swelling of th dizziness, no TMJ pain.       EXAM:   /70   Pulse 64   Resp 16   Ht 64\"   Wt 172 lb (78 kg)   SpO2 98%   BMI 29.52 kg/m²   GENERAL: well developed,in no apparent distress  SKIN: no rashes  EYES:PERRL, EOMI,Conjunctiva normal.  HEENT:  Head atraumatic

## 2020-01-28 ENCOUNTER — PATIENT OUTREACH (OUTPATIENT)
Dept: FAMILY MEDICINE CLINIC | Facility: CLINIC | Age: 60
End: 2020-01-28

## 2020-02-04 ENCOUNTER — OFFICE VISIT (OUTPATIENT)
Dept: FAMILY MEDICINE CLINIC | Facility: CLINIC | Age: 60
End: 2020-02-04

## 2020-02-04 ENCOUNTER — LAB ENCOUNTER (OUTPATIENT)
Dept: LAB | Age: 60
End: 2020-02-04
Attending: FAMILY MEDICINE
Payer: COMMERCIAL

## 2020-02-04 VITALS
HEART RATE: 72 BPM | TEMPERATURE: 99 F | BODY MASS INDEX: 29.19 KG/M2 | HEIGHT: 64 IN | DIASTOLIC BLOOD PRESSURE: 72 MMHG | WEIGHT: 171 LBS | SYSTOLIC BLOOD PRESSURE: 120 MMHG | RESPIRATION RATE: 20 BRPM | OXYGEN SATURATION: 100 %

## 2020-02-04 DIAGNOSIS — Z01.419 WELL WOMAN EXAM WITH ROUTINE GYNECOLOGICAL EXAM: Primary | ICD-10-CM

## 2020-02-04 DIAGNOSIS — Z13.21 SCREENING FOR ENDOCRINE, NUTRITIONAL, METABOLIC AND IMMUNITY DISORDER: ICD-10-CM

## 2020-02-04 DIAGNOSIS — Z13.0 SCREENING FOR ENDOCRINE, NUTRITIONAL, METABOLIC AND IMMUNITY DISORDER: ICD-10-CM

## 2020-02-04 DIAGNOSIS — Z13.29 SCREENING FOR ENDOCRINE, NUTRITIONAL, METABOLIC AND IMMUNITY DISORDER: ICD-10-CM

## 2020-02-04 DIAGNOSIS — Z13.228 SCREENING FOR ENDOCRINE, NUTRITIONAL, METABOLIC AND IMMUNITY DISORDER: ICD-10-CM

## 2020-02-04 DIAGNOSIS — Z12.4 SCREENING FOR MALIGNANT NEOPLASM OF CERVIX: ICD-10-CM

## 2020-02-04 DIAGNOSIS — Z00.00 ROUTINE GENERAL MEDICAL EXAMINATION AT A HEALTH CARE FACILITY: ICD-10-CM

## 2020-02-04 DIAGNOSIS — Z91.89 AT RISK FOR OSTEOPOROSIS: ICD-10-CM

## 2020-02-04 DIAGNOSIS — Z12.31 VISIT FOR SCREENING MAMMOGRAM: ICD-10-CM

## 2020-02-04 DIAGNOSIS — Z82.49 FAMILY HISTORY OF EARLY CAD: ICD-10-CM

## 2020-02-04 LAB
ALBUMIN SERPL-MCNC: 3.6 G/DL (ref 3.4–5)
ALBUMIN/GLOB SERPL: 0.8 {RATIO} (ref 1–2)
ALP LIVER SERPL-CCNC: 73 U/L (ref 46–118)
ALT SERPL-CCNC: 32 U/L (ref 13–56)
ANION GAP SERPL CALC-SCNC: 7 MMOL/L (ref 0–18)
AST SERPL-CCNC: 20 U/L (ref 15–37)
BASOPHILS # BLD AUTO: 0.03 X10(3) UL (ref 0–0.2)
BASOPHILS NFR BLD AUTO: 0.5 %
BILIRUB SERPL-MCNC: 0.5 MG/DL (ref 0.1–2)
BUN BLD-MCNC: 16 MG/DL (ref 7–18)
BUN/CREAT SERPL: 23.2 (ref 10–20)
CALCIUM BLD-MCNC: 9 MG/DL (ref 8.5–10.1)
CHLORIDE SERPL-SCNC: 108 MMOL/L (ref 98–112)
CHOLEST SMN-MCNC: 204 MG/DL (ref ?–200)
CO2 SERPL-SCNC: 26 MMOL/L (ref 21–32)
CREAT BLD-MCNC: 0.69 MG/DL (ref 0.55–1.02)
DEPRECATED RDW RBC AUTO: 42.7 FL (ref 35.1–46.3)
EOSINOPHIL # BLD AUTO: 0.15 X10(3) UL (ref 0–0.7)
EOSINOPHIL NFR BLD AUTO: 2.4 %
ERYTHROCYTE [DISTWIDTH] IN BLOOD BY AUTOMATED COUNT: 12.1 % (ref 11–15)
GLOBULIN PLAS-MCNC: 4.5 G/DL (ref 2.8–4.4)
GLUCOSE BLD-MCNC: 96 MG/DL (ref 70–99)
HCT VFR BLD AUTO: 42.3 % (ref 35–48)
HDLC SERPL-MCNC: 75 MG/DL (ref 40–59)
HGB BLD-MCNC: 13.7 G/DL (ref 12–16)
IMM GRANULOCYTES # BLD AUTO: 0.01 X10(3) UL (ref 0–1)
IMM GRANULOCYTES NFR BLD: 0.2 %
LDLC SERPL CALC-MCNC: 108 MG/DL (ref ?–100)
LYMPHOCYTES # BLD AUTO: 2.03 X10(3) UL (ref 1–4)
LYMPHOCYTES NFR BLD AUTO: 32.1 %
M PROTEIN MFR SERPL ELPH: 8.1 G/DL (ref 6.4–8.2)
MCH RBC QN AUTO: 31.2 PG (ref 26–34)
MCHC RBC AUTO-ENTMCNC: 32.4 G/DL (ref 31–37)
MCV RBC AUTO: 96.4 FL (ref 80–100)
MONOCYTES # BLD AUTO: 0.62 X10(3) UL (ref 0.1–1)
MONOCYTES NFR BLD AUTO: 9.8 %
NEUTROPHILS # BLD AUTO: 3.48 X10 (3) UL (ref 1.5–7.7)
NEUTROPHILS # BLD AUTO: 3.48 X10(3) UL (ref 1.5–7.7)
NEUTROPHILS NFR BLD AUTO: 55 %
NONHDLC SERPL-MCNC: 129 MG/DL (ref ?–130)
OSMOLALITY SERPL CALC.SUM OF ELEC: 293 MOSM/KG (ref 275–295)
PATIENT FASTING Y/N/NP: YES
PATIENT FASTING Y/N/NP: YES
PLATELET # BLD AUTO: 258 10(3)UL (ref 150–450)
POTASSIUM SERPL-SCNC: 4.1 MMOL/L (ref 3.5–5.1)
RBC # BLD AUTO: 4.39 X10(6)UL (ref 3.8–5.3)
SODIUM SERPL-SCNC: 141 MMOL/L (ref 136–145)
TRIGL SERPL-MCNC: 103 MG/DL (ref 30–149)
TSI SER-ACNC: 0.82 MIU/ML (ref 0.36–3.74)
VIT D+METAB SERPL-MCNC: 27.9 NG/ML (ref 30–100)
VLDLC SERPL CALC-MCNC: 21 MG/DL (ref 0–30)
WBC # BLD AUTO: 6.3 X10(3) UL (ref 4–11)

## 2020-02-04 PROCEDURE — 99396 PREV VISIT EST AGE 40-64: CPT | Performed by: FAMILY MEDICINE

## 2020-02-04 PROCEDURE — 88175 CYTOPATH C/V AUTO FLUID REDO: CPT | Performed by: FAMILY MEDICINE

## 2020-02-04 PROCEDURE — 80061 LIPID PANEL: CPT

## 2020-02-04 PROCEDURE — 80053 COMPREHEN METABOLIC PANEL: CPT

## 2020-02-04 PROCEDURE — 87624 HPV HI-RISK TYP POOLED RSLT: CPT | Performed by: FAMILY MEDICINE

## 2020-02-04 PROCEDURE — 36415 COLL VENOUS BLD VENIPUNCTURE: CPT

## 2020-02-04 PROCEDURE — 84443 ASSAY THYROID STIM HORMONE: CPT

## 2020-02-04 PROCEDURE — 85025 COMPLETE CBC W/AUTO DIFF WBC: CPT

## 2020-02-04 PROCEDURE — 82306 VITAMIN D 25 HYDROXY: CPT

## 2020-02-04 NOTE — PROGRESS NOTES
Kellie Brandon is a 61year old female who presents for a complete physical exam.   HPI:         Patient feels well, dental visit up to date, no hearing problem. Vaccinations- up to date.     Period:postmenopausal.  Sexual activity:monogamy use: No    Drug use: No       REVIEW OF SYSTEMS:   GENERAL HEALTH: feels well, no fatigue.   SKIN: denies any unusual skin lesions or rashes  EYES: no visual complaints or deficits  HEENT: denies nasal congestion, sinus pain or sore throat; hearing loss neg coordination and gait normal and symmetric. Sensation intact. Extremities: are symmetric with no cyanosis, clubbing, or edema. MS: Normal muscles tones, no joints abnormalities. SKIN: Normal color, turgor, no lesions, rashes or wounds.   PSYCH: normal aff

## 2020-02-04 NOTE — PATIENT INSTRUCTIONS
[facility]    Dr. Tariq Carolina    [address]  [facility]  [place], [address]      [address]  [facility]    [id number] [place] [address]

## 2020-02-05 LAB — HPV I/H RISK 1 DNA SPEC QL NAA+PROBE: NEGATIVE

## 2020-06-01 ENCOUNTER — TELEPHONE (OUTPATIENT)
Dept: FAMILY MEDICINE CLINIC | Facility: CLINIC | Age: 60
End: 2020-06-01

## 2020-06-01 NOTE — TELEPHONE ENCOUNTER
Please see below message & advise how to proceed with pt's issues. If [place] is in [place] still do you want a virtual appt scheduled today? Thank you.

## 2020-06-01 NOTE — TELEPHONE ENCOUNTER
Pt is out of town right now but coming home tomorrow. Pt was caring for her grandchild and carrying him/her around a lot. Now her left hand 3rd, 4th finger is numb. What could this be? Also, pt bumped right foot and it is painful now.

## 2020-06-01 NOTE — TELEPHONE ENCOUNTER
Spoke to pt and she is not flying in till sometime tomorrow late afternoon.   Made appt with Dr. Ana Perera for 6/4 in office

## 2020-06-04 ENCOUNTER — HOSPITAL ENCOUNTER (OUTPATIENT)
Dept: GENERAL RADIOLOGY | Age: 60
Discharge: HOME | End: 2020-06-04
Attending: FAMILY MEDICINE
Payer: COMMERCIAL

## 2020-06-04 ENCOUNTER — TELEPHONE (OUTPATIENT)
Dept: PODIATRY CLINIC | Facility: CLINIC | Age: 60
End: 2020-06-04

## 2020-06-04 ENCOUNTER — OFFICE VISIT (OUTPATIENT)
Dept: FAMILY MEDICINE CLINIC | Facility: CLINIC | Age: 60
End: 2020-06-04

## 2020-06-04 ENCOUNTER — TELEPHONE (OUTPATIENT)
Dept: FAMILY MEDICINE CLINIC | Facility: CLINIC | Age: 60
End: 2020-06-04

## 2020-06-04 VITALS
RESPIRATION RATE: 18 BRPM | TEMPERATURE: 99 F | HEART RATE: 79 BPM | DIASTOLIC BLOOD PRESSURE: 78 MMHG | WEIGHT: 175 LBS | BODY MASS INDEX: 29.88 KG/M2 | OXYGEN SATURATION: 98 % | SYSTOLIC BLOOD PRESSURE: 112 MMHG | HEIGHT: 64 IN

## 2020-06-04 DIAGNOSIS — G56.22 ULNAR NERVE COMPRESSION, LEFT: ICD-10-CM

## 2020-06-04 DIAGNOSIS — S99.921A INJURY OF TOE ON RIGHT FOOT, INITIAL ENCOUNTER: Primary | ICD-10-CM

## 2020-06-04 DIAGNOSIS — S99.921A INJURY OF TOE ON RIGHT FOOT, INITIAL ENCOUNTER: ICD-10-CM

## 2020-06-04 DIAGNOSIS — S92.531A CLOSED DISPLACED FRACTURE OF DISTAL PHALANX OF LESSER TOE OF RIGHT FOOT, INITIAL ENCOUNTER: ICD-10-CM

## 2020-06-04 PROCEDURE — 99214 OFFICE O/P EST MOD 30 MIN: CPT | Performed by: FAMILY MEDICINE

## 2020-06-04 PROCEDURE — 73660 X-RAY EXAM OF TOE(S): CPT | Performed by: FAMILY MEDICINE

## 2020-06-04 RX ORDER — METHYLPREDNISOLONE 4 MG/1
TABLET ORAL
Qty: 1 KIT | Refills: 0 | Status: SHIPPED | OUTPATIENT
Start: 2020-06-04

## 2020-06-04 RX ORDER — ACETAMINOPHEN AND CODEINE PHOSPHATE 300; 30 MG/1; MG/1
1-2 TABLET ORAL NIGHTLY PRN
Qty: 30 TABLET | Refills: 0 | Status: SHIPPED | OUTPATIENT
Start: 2020-06-04 | End: 2020-06-05 | Stop reason: ALTCHOICE

## 2020-06-04 NOTE — TELEPHONE ENCOUNTER
Pt was referred to Dr. Oscar Whitley for toe fracture, pt asking for appointment soon. Please call thank you.

## 2020-06-04 NOTE — PROGRESS NOTES
Patient presents with:  New Patient Chief Complaint: numbness/tingly left hand, right foot small toe swollen       HPI: R foot has been hurting for 7 days . Dull in character. Radiation-none. /10. No weakness. No numbness or tingling. Worsening.  Movement TOE(S) (MIN 2 VIEWS), RIGHT 5TH (CPT=73660)            (G56.22) Ulnar nerve compression, left  Plan: rest, note for work.   Requested Prescriptions     Signed Prescriptions Disp Refills   • methylPREDNISolone (MEDROL) 4 MG Oral Tablet Therapy Pack 1 kit 0

## 2020-06-04 NOTE — TELEPHONE ENCOUNTER
Spoke to pt and scheduled appt with Dr Lisa Carreno for tomorrow at [facility] at 2:30pm.   Pt to call PCP to have referral changed to reflect Dr Vanessa Villa.

## 2020-06-04 NOTE — TELEPHONE ENCOUNTER
Called pt to discuss X-ray results from today:     Closed displaced fracture of distal phalanx of lesser toe of right foot, initial     Dr. Mike Albarado is referring to specialist:   Alison Apley, DPM [address] [place] [address] [address]

## 2020-06-05 ENCOUNTER — OFFICE VISIT (OUTPATIENT)
Dept: PODIATRY CLINIC | Facility: CLINIC | Age: 60
End: 2020-06-05

## 2020-06-05 DIAGNOSIS — S92.514A CLOSED NONDISPLACED FRACTURE OF PROXIMAL PHALANX OF LESSER TOE OF RIGHT FOOT, INITIAL ENCOUNTER: Primary | ICD-10-CM

## 2020-06-05 PROCEDURE — 99243 OFF/OP CNSLTJ NEW/EST LOW 30: CPT | Performed by: PODIATRIST

## 2020-06-05 NOTE — PROGRESS NOTES
HPI:    Patient ID: Siomara Rice is a 61year old female. This pleasant 59-year-old female presents to me today as a new patient on consult from [address] [place] [address]. Patient's primary complaint is pain associated with the fifth right toe.   She jammed it ap encounter       Imaging & Referrals:  None       YW#[id number]

## 2020-06-07 ENCOUNTER — PATIENT MESSAGE (OUTPATIENT)
Dept: FAMILY MEDICINE CLINIC | Facility: CLINIC | Age: 60
End: 2020-06-07

## 2020-06-07 DIAGNOSIS — Z13.820 SCREENING FOR OSTEOPOROSIS: Primary | ICD-10-CM

## 2020-06-07 DIAGNOSIS — Z78.0 ENCOUNTER FOR OSTEOPOROSIS SCREENING IN ASYMPTOMATIC POSTMENOPAUSAL PATIENT: ICD-10-CM

## 2020-06-07 DIAGNOSIS — Z13.820 ENCOUNTER FOR OSTEOPOROSIS SCREENING IN ASYMPTOMATIC POSTMENOPAUSAL PATIENT: ICD-10-CM

## 2020-06-08 NOTE — TELEPHONE ENCOUNTER
Please see pt e-mail. Will you complete pt's disability forms or does Podiatry need to complete forms? Ok to order [address]? Please advise on diagnosis for Dexa. Thank you.

## 2020-06-08 NOTE — TELEPHONE ENCOUNTER
Trevin Olivas MD  You 1 hour ago (12:37 PM)      Will do the forms, ok for Dexa.  Ask how many weeks ortho said no work    Routing comment

## 2020-06-08 NOTE — TELEPHONE ENCOUNTER
From: Pam Alvarado  To: Lavelle Lugo MD  Sent: 6/7/2020 9:32 AM CDT  Subject: Visit Follow-up Question    Dr Shereen Denson. Because I used a lot of my PTO, I got call from my HR, I could use my [employer] \"short term disability insurance\".  Only if you c

## 2020-06-15 ENCOUNTER — PATIENT MESSAGE (OUTPATIENT)
Dept: FAMILY MEDICINE CLINIC | Facility: CLINIC | Age: 60
End: 2020-06-15

## 2020-06-16 ENCOUNTER — PATIENT MESSAGE (OUTPATIENT)
Dept: FAMILY MEDICINE CLINIC | Facility: CLINIC | Age: 60
End: 2020-06-16

## 2020-06-16 NOTE — TELEPHONE ENCOUNTER
From: Domingo Leach  To: Cheerlle Olivares MD  Sent: 6/15/2020 6:35 PM CDT  Subject: Visit [facility]    Dear Dr Lavinia Chi,     I understand that I received more time off of work, what's the end date ?   Per my Supervisor my date to return to work

## 2020-06-16 NOTE — TELEPHONE ENCOUNTER
----- Message from [facility] sent at 6/16/2020 12:45 PM CDT -----  Regarding: Visit Follow-up Question  Contact: [phone]  Jared Mcelroy  Can you please fax to my work at [employer], fady Smith, note that I may return to work.    I need th -IV venofer ordered   -Check CBC 4 pm    -Continue current care  -Plan for discharge later today  -d/w dr. Bowling

## 2020-06-16 NOTE — TELEPHONE ENCOUNTER
From: Roxanna Katz  To: Larry Harley MD  Sent: 6/15/2020 5:50 PM CDT  Subject: Visit [facility] Dr Shanta Powers     I just found out from my supervisor that I am off of work for about another 2 weeks or so. This is new to me.  I thought

## 2020-07-21 ENCOUNTER — MED REC SCAN ONLY (OUTPATIENT)
Dept: FAMILY MEDICINE CLINIC | Facility: CLINIC | Age: 60
End: 2020-07-21

## 2020-09-15 ENCOUNTER — HOSPITAL ENCOUNTER (OUTPATIENT)
Dept: MAMMOGRAPHY | Age: 60
Discharge: HOME | End: 2020-09-15
Attending: FAMILY MEDICINE
Payer: COMMERCIAL

## 2020-09-15 ENCOUNTER — HOSPITAL ENCOUNTER (OUTPATIENT)
Dept: BONE DENSITY | Age: 60
Discharge: HOME | End: 2020-09-15
Attending: FAMILY MEDICINE
Payer: COMMERCIAL

## 2020-09-15 DIAGNOSIS — Z91.89 AT RISK FOR OSTEOPOROSIS: ICD-10-CM

## 2020-09-15 DIAGNOSIS — Z12.31 VISIT FOR SCREENING MAMMOGRAM: ICD-10-CM

## 2020-09-15 PROCEDURE — 77067 SCR MAMMO BI INCL CAD: CPT | Performed by: FAMILY MEDICINE

## 2020-09-15 PROCEDURE — 77080 DXA BONE DENSITY AXIAL: CPT | Performed by: FAMILY MEDICINE

## 2020-09-15 PROCEDURE — 77063 BREAST TOMOSYNTHESIS BI: CPT | Performed by: FAMILY MEDICINE

## 2022-01-04 PROBLEM — K44.9 HIATAL HERNIA: Status: ACTIVE | Noted: 2022-01-04

## 2022-01-04 PROBLEM — F41.9 ANXIETY: Status: ACTIVE | Noted: 2022-01-04

## 2022-01-04 PROBLEM — E55.9 VITAMIN D DEFICIENCY: Status: ACTIVE | Noted: 2022-01-04

## 2022-01-04 PROBLEM — K21.00 GASTROESOPHAGEAL REFLUX DISEASE WITH ESOPHAGITIS WITHOUT HEMORRHAGE: Status: ACTIVE | Noted: 2022-01-04

## 2022-01-11 ENCOUNTER — TELEPHONE (OUTPATIENT)
Dept: FAMILY MEDICINE CLINIC | Facility: CLINIC | Age: 62
End: 2022-01-11

## 2022-01-24 PROBLEM — M81.0 AGE-RELATED OSTEOPOROSIS WITHOUT CURRENT PATHOLOGICAL FRACTURE: Status: ACTIVE | Noted: 2022-01-24

## 2022-11-04 ENCOUNTER — OFFICE VISIT (OUTPATIENT)
Dept: FAMILY MEDICINE CLINIC | Facility: CLINIC | Age: 62
End: 2022-11-04
Payer: COMMERCIAL

## 2022-11-04 ENCOUNTER — LAB ENCOUNTER (OUTPATIENT)
Dept: LAB | Age: 62
End: 2022-11-04
Attending: FAMILY MEDICINE
Payer: COMMERCIAL

## 2022-11-04 VITALS
OXYGEN SATURATION: 98 % | DIASTOLIC BLOOD PRESSURE: 70 MMHG | BODY MASS INDEX: 26.58 KG/M2 | HEART RATE: 64 BPM | WEIGHT: 150 LBS | RESPIRATION RATE: 16 BRPM | SYSTOLIC BLOOD PRESSURE: 120 MMHG | HEIGHT: 63 IN

## 2022-11-04 DIAGNOSIS — Z13.21 SCREENING FOR ENDOCRINE, NUTRITIONAL, METABOLIC AND IMMUNITY DISORDER: ICD-10-CM

## 2022-11-04 DIAGNOSIS — Z13.0 SCREENING FOR ENDOCRINE, NUTRITIONAL, METABOLIC AND IMMUNITY DISORDER: ICD-10-CM

## 2022-11-04 DIAGNOSIS — Z13.29 SCREENING FOR ENDOCRINE, NUTRITIONAL, METABOLIC AND IMMUNITY DISORDER: ICD-10-CM

## 2022-11-04 DIAGNOSIS — Z13.228 SCREENING FOR ENDOCRINE, NUTRITIONAL, METABOLIC AND IMMUNITY DISORDER: ICD-10-CM

## 2022-11-04 DIAGNOSIS — Z00.00 ROUTINE GENERAL MEDICAL EXAMINATION AT A HEALTH CARE FACILITY: Primary | ICD-10-CM

## 2022-11-04 LAB
ALBUMIN SERPL-MCNC: 4.1 G/DL (ref 3.4–5)
ALBUMIN/GLOB SERPL: 1.2 {RATIO} (ref 1–2)
ALP LIVER SERPL-CCNC: 61 U/L
ALT SERPL-CCNC: 25 U/L
ANION GAP SERPL CALC-SCNC: 3 MMOL/L (ref 0–18)
AST SERPL-CCNC: 15 U/L (ref 15–37)
BASOPHILS # BLD AUTO: 0.02 X10(3) UL (ref 0–0.2)
BASOPHILS NFR BLD AUTO: 0.4 %
BILIRUB SERPL-MCNC: 0.8 MG/DL (ref 0.1–2)
BUN BLD-MCNC: 10 MG/DL (ref 7–18)
CALCIUM BLD-MCNC: 9.6 MG/DL (ref 8.5–10.1)
CHLORIDE SERPL-SCNC: 107 MMOL/L (ref 98–112)
CHOLEST SERPL-MCNC: 201 MG/DL (ref ?–200)
CO2 SERPL-SCNC: 29 MMOL/L (ref 21–32)
CREAT BLD-MCNC: 0.66 MG/DL
EOSINOPHIL # BLD AUTO: 0.05 X10(3) UL (ref 0–0.7)
EOSINOPHIL NFR BLD AUTO: 1 %
ERYTHROCYTE [DISTWIDTH] IN BLOOD BY AUTOMATED COUNT: 11.9 %
FASTING PATIENT LIPID ANSWER: YES
FASTING STATUS PATIENT QL REPORTED: YES
GFR SERPLBLD BASED ON 1.73 SQ M-ARVRAT: 99 ML/MIN/1.73M2 (ref 60–?)
GLOBULIN PLAS-MCNC: 3.3 G/DL (ref 2.8–4.4)
GLUCOSE BLD-MCNC: 93 MG/DL (ref 70–99)
HCT VFR BLD AUTO: 41.7 %
HDLC SERPL-MCNC: 103 MG/DL (ref 40–59)
HGB BLD-MCNC: 14.1 G/DL
IMM GRANULOCYTES # BLD AUTO: 0.01 X10(3) UL (ref 0–1)
IMM GRANULOCYTES NFR BLD: 0.2 %
LDLC SERPL CALC-MCNC: 87 MG/DL (ref ?–100)
LYMPHOCYTES # BLD AUTO: 1.79 X10(3) UL (ref 1–4)
LYMPHOCYTES NFR BLD AUTO: 35.2 %
MCH RBC QN AUTO: 31.8 PG (ref 26–34)
MCHC RBC AUTO-ENTMCNC: 33.8 G/DL (ref 31–37)
MCV RBC AUTO: 93.9 FL
MONOCYTES # BLD AUTO: 0.47 X10(3) UL (ref 0.1–1)
MONOCYTES NFR BLD AUTO: 9.2 %
NEUTROPHILS # BLD AUTO: 2.75 X10 (3) UL (ref 1.5–7.7)
NEUTROPHILS # BLD AUTO: 2.75 X10(3) UL (ref 1.5–7.7)
NEUTROPHILS NFR BLD AUTO: 54 %
NONHDLC SERPL-MCNC: 98 MG/DL (ref ?–130)
OSMOLALITY SERPL CALC.SUM OF ELEC: 287 MOSM/KG (ref 275–295)
PLATELET # BLD AUTO: 226 10(3)UL (ref 150–450)
POTASSIUM SERPL-SCNC: 4 MMOL/L (ref 3.5–5.1)
PROT SERPL-MCNC: 7.4 G/DL (ref 6.4–8.2)
RBC # BLD AUTO: 4.44 X10(6)UL
SODIUM SERPL-SCNC: 139 MMOL/L (ref 136–145)
TRIGL SERPL-MCNC: 61 MG/DL (ref 30–149)
TSI SER-ACNC: 0.27 MIU/ML (ref 0.36–3.74)
VIT D+METAB SERPL-MCNC: 31.4 NG/ML (ref 30–100)
VLDLC SERPL CALC-MCNC: 10 MG/DL (ref 0–30)
WBC # BLD AUTO: 5.1 X10(3) UL (ref 4–11)

## 2022-11-04 PROCEDURE — 3008F BODY MASS INDEX DOCD: CPT | Performed by: FAMILY MEDICINE

## 2022-11-04 PROCEDURE — 85025 COMPLETE CBC W/AUTO DIFF WBC: CPT

## 2022-11-04 PROCEDURE — 99396 PREV VISIT EST AGE 40-64: CPT | Performed by: FAMILY MEDICINE

## 2022-11-04 PROCEDURE — 36415 COLL VENOUS BLD VENIPUNCTURE: CPT

## 2022-11-04 PROCEDURE — 80061 LIPID PANEL: CPT

## 2022-11-04 PROCEDURE — 3074F SYST BP LT 130 MM HG: CPT | Performed by: FAMILY MEDICINE

## 2022-11-04 PROCEDURE — 3078F DIAST BP <80 MM HG: CPT | Performed by: FAMILY MEDICINE

## 2022-11-04 PROCEDURE — 84443 ASSAY THYROID STIM HORMONE: CPT

## 2022-11-04 PROCEDURE — 80053 COMPREHEN METABOLIC PANEL: CPT

## 2022-11-04 PROCEDURE — 82306 VITAMIN D 25 HYDROXY: CPT

## 2022-11-04 RX ORDER — CLONAZEPAM 0.5 MG/1
0.5 TABLET ORAL AS NEEDED
COMMUNITY
Start: 2022-08-09 | End: 2022-11-04

## 2022-11-04 RX ORDER — CLONAZEPAM 0.5 MG/1
0.5 TABLET ORAL NIGHTLY PRN
Qty: 30 TABLET | Refills: 5 | Status: SHIPPED | OUTPATIENT
Start: 2022-11-04

## 2022-11-07 ENCOUNTER — TELEPHONE (OUTPATIENT)
Dept: FAMILY MEDICINE CLINIC | Facility: CLINIC | Age: 62
End: 2022-11-07

## 2022-11-07 RX ORDER — CLONAZEPAM 1 MG/1
1 TABLET ORAL NIGHTLY PRN
Qty: 30 TABLET | Refills: 5 | Status: SHIPPED | OUTPATIENT
Start: 2022-11-07

## 2022-11-07 NOTE — TELEPHONE ENCOUNTER
Pt called requesting a 1mg dose of clonazePAM; pt currently on 0.5 MG Oral Tab. Pt states she was initially prescribed 1mg from Dr. Jose Guadalupe Richards. Pt having trouble staying asleep, says taking 2 of .5mg tablets keeps her asleep throughout the night.  Please advise

## 2022-11-08 DIAGNOSIS — R79.89 ABNORMAL TSH: Primary | ICD-10-CM

## 2022-11-09 ENCOUNTER — LAB ENCOUNTER (OUTPATIENT)
Dept: LAB | Age: 62
End: 2022-11-09
Attending: FAMILY MEDICINE
Payer: COMMERCIAL

## 2022-11-09 DIAGNOSIS — R79.89 ABNORMAL TSH: ICD-10-CM

## 2022-11-09 LAB
T4 FREE SERPL-MCNC: 1 NG/DL (ref 0.8–1.7)
THYROGLOB SERPL-MCNC: <15 U/ML (ref ?–60)
THYROPEROXIDASE AB SERPL-ACNC: <28 U/ML (ref ?–60)
TSI SER-ACNC: 0.34 MIU/ML (ref 0.36–3.74)

## 2022-11-09 PROCEDURE — 84439 ASSAY OF FREE THYROXINE: CPT

## 2022-11-09 PROCEDURE — 86376 MICROSOMAL ANTIBODY EACH: CPT

## 2022-11-09 PROCEDURE — 86800 THYROGLOBULIN ANTIBODY: CPT

## 2022-11-09 PROCEDURE — 84443 ASSAY THYROID STIM HORMONE: CPT

## 2022-11-09 PROCEDURE — 36415 COLL VENOUS BLD VENIPUNCTURE: CPT

## 2022-11-11 ENCOUNTER — TELEPHONE (OUTPATIENT)
Dept: FAMILY MEDICINE CLINIC | Facility: CLINIC | Age: 62
End: 2022-11-11

## 2023-03-17 ENCOUNTER — PATIENT MESSAGE (OUTPATIENT)
Dept: FAMILY MEDICINE CLINIC | Facility: CLINIC | Age: 63
End: 2023-03-17

## 2023-03-17 RX ORDER — CLONAZEPAM 2 MG/1
2 TABLET ORAL 2 TIMES DAILY PRN
Qty: 60 TABLET | Refills: 3 | Status: SHIPPED | OUTPATIENT
Start: 2023-03-17

## 2023-03-17 NOTE — TELEPHONE ENCOUNTER
From: Lenin Guzman  To: Frank Bustamante MD  Sent: 3/17/2023 12:55 PM CDT  Subject: My Clonopin    Dear Dr Iniguez Else, so this Clonopin is good, but I had to take 4tb (4 mg) last night to get really nice uninterrupted sleep. I noticed the 2 mg stopped working as it used to. I would like to ask you for increasing the dose again, to the 4 mg per night. Thank you, and I hope you have a great day. Ryley.

## 2023-08-24 ENCOUNTER — PATIENT MESSAGE (OUTPATIENT)
Dept: FAMILY MEDICINE CLINIC | Facility: CLINIC | Age: 63
End: 2023-08-24

## 2023-08-24 RX ORDER — CLONAZEPAM 2 MG/1
2 TABLET ORAL 2 TIMES DAILY PRN
Qty: 60 TABLET | Refills: 3 | OUTPATIENT
Start: 2023-08-24

## 2023-08-24 NOTE — TELEPHONE ENCOUNTER
From: Ivonne Mitchell  To: Praveena Rubio MD  Sent: 8/24/2023 4:03 PM CDT  Subject: Clonazepam    Dear Dr. Funes, I am still dealing with insomnia, and that pill helps. If possible, I would like to continue for time being. Thank you.

## 2023-08-25 RX ORDER — CLONAZEPAM 2 MG/1
2 TABLET ORAL 2 TIMES DAILY PRN
Qty: 60 TABLET | Refills: 0 | Status: SHIPPED | OUTPATIENT
Start: 2023-08-25

## 2023-11-07 ENCOUNTER — OFFICE VISIT (OUTPATIENT)
Facility: LOCATION | Age: 63
End: 2023-11-07
Payer: COMMERCIAL

## 2023-11-07 DIAGNOSIS — H61.23 CERUMEN DEBRIS ON TYMPANIC MEMBRANE OF BOTH EARS: ICD-10-CM

## 2023-11-07 DIAGNOSIS — L30.9 DERMATITIS OF EXTERNAL EAR: Primary | ICD-10-CM

## 2023-11-07 PROCEDURE — 99203 OFFICE O/P NEW LOW 30 MIN: CPT | Performed by: OTOLARYNGOLOGY

## 2023-11-07 RX ORDER — CIPROFLOXACIN AND DEXAMETHASONE 3; 1 MG/ML; MG/ML
4 SUSPENSION/ DROPS AURICULAR (OTIC) EVERY 12 HOURS
Qty: 1 EACH | Refills: 0 | Status: SHIPPED | OUTPATIENT
Start: 2023-11-07 | End: 2023-11-14

## 2023-11-07 NOTE — PROGRESS NOTES
Elroy Mares is a 61year old female. Chief Complaint   Patient presents with    Cerumen Impaction     HPI:   She has small ear canals. She tends to build up wax. At times she will get some itching. She denies any pain. Current Outpatient Medications   Medication Sig Dispense Refill    ciprofloxacin-dexamethasone 0.3-0.1 % Otic Suspension Place 4 drops into both ears every 12 (twelve) hours for 7 days. 1 each 0    clonazePAM 2 MG Oral Tab Take 1 tablet (2 mg total) by mouth 2 (two) times daily as needed for Anxiety (Or Sleep). 60 tablet 2      Past Medical History:   Diagnosis Date    Depression     Depressive disorder, not elsewhere classified     Normal delivery       Social History:  Social History     Socioeconomic History    Marital status:    Tobacco Use    Smoking status: Never    Smokeless tobacco: Never    Tobacco comments:     quit 1`0 yrs ago   Vaping Use    Vaping Use: Never used   Substance and Sexual Activity    Alcohol use: No    Drug use: No   Other Topics Concern    Caffeine Concern Yes    Exercise No    Seat Belt Yes   Social History Narrative    ** Merged History Encounter **           Past Surgical History:   Procedure Laterality Date    CYST ASPIRATION LEFT  1995    breast    TONSILLECTOMY  1/1/84    UPPER GI ENDOSCOPY,BIOPSY  08/2017    Kwok's; [place]- confirmed without dysplasia         REVIEW OF SYSTEMS:   GENERAL HEALTH: feels well otherwise  GENERAL : denies fever, chills, sweats, weight loss, weight gain  SKIN: denies any unusual skin lesions or rashes  RESPIRATORY: denies shortness of breath with exertion  NEURO: denies headaches    EXAM:   There were no vitals taken for this visit. System Findings Details   Constitutional  Overall appearance - Normal.   Psychiatric  Orientation - Oriented to time, place, person & situation. Appropriate mood and affect.    Head/Face  Facial features -- Normal. Skull - Normal.   Eyes  Pupils equal ,round ,react to light and accomidate Ears, Nose, Throat, Neck  Very small ear canals bilaterally. There is quite a bit of debris down by the eardrum. It took me a while to remove it under the microscope but I was able to get it out. There is some swelling of the ear canal skin. Neurological  Memory - Normal. Cranial nerves - Cranial nerves II through XII grossly intact. Lymph Detail  Submental. Submandibular. Anterior cervical. Posterior cervical. Supraclavicular. ASSESSMENT AND PLAN:   1. Dermatitis of external ear  I do believe that there is a component of dermatitis. Her hearing is improved. If it does not completely resolve she will then start on Ciprodex drops and see me back in a few weeks for recheck. If things get back to normal she will least see me once a year for cleaning of her ears and she will also keep Q-tips out of her ears. 2. Cerumen debris on tympanic membrane of both ears  Removed today      The patient indicates understanding of these issues and agrees to the plan. No follow-ups on file.     Jose Rios MD  11/7/2023  2:56 PM

## 2024-05-08 ENCOUNTER — OFFICE VISIT (OUTPATIENT)
Dept: FAMILY MEDICINE CLINIC | Facility: CLINIC | Age: 64
End: 2024-05-08
Payer: COMMERCIAL

## 2024-05-08 ENCOUNTER — HOSPITAL ENCOUNTER (OUTPATIENT)
Dept: GENERAL RADIOLOGY | Age: 64
Discharge: HOME | End: 2024-05-08
Attending: NURSE PRACTITIONER
Payer: COMMERCIAL

## 2024-05-08 ENCOUNTER — TELEPHONE (OUTPATIENT)
Facility: LOCATION | Age: 64
End: 2024-05-08

## 2024-05-08 VITALS
SYSTOLIC BLOOD PRESSURE: 130 MMHG | OXYGEN SATURATION: 98 % | RESPIRATION RATE: 16 BRPM | DIASTOLIC BLOOD PRESSURE: 80 MMHG | WEIGHT: 124 LBS | HEIGHT: 63 IN | BODY MASS INDEX: 21.97 KG/M2 | HEART RATE: 84 BPM

## 2024-05-08 DIAGNOSIS — M25.471 RIGHT ANKLE SWELLING: Primary | ICD-10-CM

## 2024-05-08 DIAGNOSIS — M25.471 RIGHT ANKLE SWELLING: ICD-10-CM

## 2024-05-08 PROCEDURE — 99214 OFFICE O/P EST MOD 30 MIN: CPT | Performed by: NURSE PRACTITIONER

## 2024-05-08 PROCEDURE — 73610 X-RAY EXAM OF ANKLE: CPT | Performed by: NURSE PRACTITIONER

## 2024-05-08 PROCEDURE — 3008F BODY MASS INDEX DOCD: CPT | Performed by: NURSE PRACTITIONER

## 2024-05-08 PROCEDURE — 3079F DIAST BP 80-89 MM HG: CPT | Performed by: NURSE PRACTITIONER

## 2024-05-08 PROCEDURE — 3075F SYST BP GE 130 - 139MM HG: CPT | Performed by: NURSE PRACTITIONER

## 2024-05-08 RX ORDER — METHYLPREDNISOLONE 4 MG/1
TABLET ORAL
Qty: 21 EACH | Refills: 0 | Status: SHIPPED | OUTPATIENT
Start: 2024-05-08

## 2024-05-08 NOTE — TELEPHONE ENCOUNTER
Reviewed patient's note from nurse practitioner today.  Because x-rays are negative, I would recommend everything that the nurse practitioner has done.  Would recommend patient being seen if symptoms worsen with treatment regimen that has already been provided by nurse practitioner.  Thank you   The patient is a 62y Female complaining of chest pain.

## 2024-05-08 NOTE — PROGRESS NOTES
Chief Complaint   Patient presents with    Ankle Pain     Right ankle pain     HPI:   Ryley Guzman is a 63 year old female present with complain of  R ankle  swelling   Time of injury: no   Location: R ankle   Nature/type of injury: none /walking   Able to bear weight: yes   Pain described as - sharp/stabbing and throbbing.  Pain rated at - 5/10  Pain severity now is moderate.  Radiation - none   Pain is aggravated by movement, use and palpation  Pain is alleviated by rest, immobilization, and elevation  Symptoms associated with pain include none.  Care prior to arrival consisted of rest, elevation, and ice, with minimal relief.   Prior history of ankle injury/sprain/fracture: yes sprain     No current outpatient medications on file.      Past Medical History:    Depression    Depressive disorder, not elsewhere classified    Normal delivery (HCC)      Social History:  Social History     Socioeconomic History    Marital status:    Tobacco Use    Smoking status: Never    Smokeless tobacco: Never    Tobacco comments:     quit 1`0 yrs ago   Vaping Use    Vaping status: Never Used   Substance and Sexual Activity    Alcohol use: No    Drug use: No   Other Topics Concern    Caffeine Concern Yes    Exercise No    Seat Belt Yes   Social History Narrative    ** Merged History Encounter **               REVIEW OF SYSTEMS:     Positive for R  ankle injury/sprain  Other systems are as noted in HPI  All other systems reviewed and negative except as noted above      EXAM:   /80   Pulse 84   Resp 16   Ht 5' 3\" (1.6 m)   Wt 124 lb (56.2 kg)   SpO2 98%   BMI 21.97 kg/m²   GENERAL: well developed, well nourished,in no apparent distress  SKIN: no rashes,no suspicious lesions  LUNGS: clear to auscultation  CARDIO: RRR without murmur  EXTREMITIES:   R ankle exam:   - defect/deformity : no   - swelling/effusion: yes   - tenderness over medial malleolus: yes   - tenderness over lateral malleolus: yes   - tenderness  over ATFL: no   - tenderness over CFL: no   - tenderness over head of fibula: no   - pain with range of motion: yes   - joint laxity: no   - crepitus: no   - bruising/ecchymosis: no   - rest of foot exam: Normal   - pedal pulses: Intact  - sensations: intact  - good cap refill     ASSESSMENT AND PLAN:       PLAN:   Diagnoses and all orders for this visit:    Right ankle swelling  -     Cancel: XR ANKLE (MIN 3 VIEWS), RIGHT (CPT=73610); Future  -     methylPREDNISolone (MEDROL) 4 MG Oral Tablet Therapy Pack; As directed.  -     Podiatry Referral - In Network  -     XR ANKLE (MIN 3 VIEWS), RIGHT (CPT=73610); Future  -     PHYSICAL THERAPY EXTERNAL       R ankle X-ray --> CONCLUSION:  No evidence of acute displaced fracture or dislocation in the right ankle.  Soft tissue swelling.   Rest, ice application followed by heat. Elevate extremity.  NSAIDs/Tylenol for pain   Apply Ace wrap with ambulation. Take Ace wrap off at night before bedtime  Avoid physical activity for now and possibly for next 7-10 days.  Resume activities as tolerated.  Follow up with pcp if not better in 7-10 days for recheck and possible repeat X-rays to rule out any occult fracture

## 2024-05-08 NOTE — TELEPHONE ENCOUNTER
Per patient has ankle swelling, will be having xray done today to rule out fracture, asking if she can be seen this week? Please advise thank you.

## 2024-05-08 NOTE — TELEPHONE ENCOUNTER
X-ray in chart. Is this someone who can be added today? No consult openings, just 15 minute spots

## 2024-05-13 NOTE — TELEPHONE ENCOUNTER
Spoke with patient. Let her know recommendations from Dr. Jacobson. Patient endorses injuring her ankle several times when younger. States that on May 3rd, was out for a long walk and the next day her ankle swelled up. She has since seen an NP and Dr. Jacobson reviewed her chart and agrees with NP recommendations. Spoke with patient to convey this information and patient states it has been 10 days and little to no improvement. States she is elevating and using comfrey ointment on area. I suggested using ice, which she stopped using and to definitely start Physical Therapy. Since patient prefers a more natural approach I advised to try castor oil massage to area with the current regimen. Advised that Physical Therapy can give her home exercises and that that will definitely help with strengthening the area. Patient is currently off work and returns on May 28. I did make an appointment with Dr. Jacobson for 6/4/24 at 3:15 pm which she can cancel if desires.

## 2024-05-14 ENCOUNTER — ORDER TRANSCRIPTION (OUTPATIENT)
Dept: PHYSICAL THERAPY | Facility: HOSPITAL | Age: 64
End: 2024-05-14

## 2024-05-14 ENCOUNTER — TELEPHONE (OUTPATIENT)
Dept: FAMILY MEDICINE CLINIC | Facility: CLINIC | Age: 64
End: 2024-05-14

## 2024-05-14 DIAGNOSIS — M25.471 RIGHT ANKLE SWELLING: Primary | ICD-10-CM

## 2024-05-14 NOTE — TELEPHONE ENCOUNTER
Received short term disability forms by fax.   Emailed forms to forms department, no payment nor DONTE was collected.

## 2024-05-14 NOTE — TELEPHONE ENCOUNTER
Disability form received in forms department and logged for processing. No Release of Information - patient sent [employer] message.

## 2024-05-16 NOTE — TELEPHONE ENCOUNTER
I called patient to gather details for their short term disability form:    Type of Leave: Continuous short term disability   Reason for Leave: Sprained ankle  Start date of leave: 5/6/2024 - 5/27/2024 Return to work: 5/28/2024  How much time needed?: N/A  Forms Due Date: ASAP   Was Fee and Turnaround info Given?: Yes

## 2024-05-16 NOTE — TELEPHONE ENCOUNTER
Good afternoon, Dr. Denise -     Your patient, Ryley Guzman, is requesting short term disability for her sprained ankle - starting leave on 5/6/2024 and ending on 5/27/2024, with a return to work date on 5/28/2024.     Do you support? If not, what are your suggestions?    Cheyanne Barton Department

## 2024-05-16 NOTE — TELEPHONE ENCOUNTER
DUC Ashley -       Please sign off on this form if you agree to:      Short Term Disability - Ankle Sprain       Startin2024        Endin2024    (place your signature on the first page only)   -From your Inbasket, Highlight the patient and click \"Chart\"   -Double click the 2024 Forms Completion telephone encounter  (under Vanessa Denise MD)  -Scroll down to the Media section   -Click the blue Hyperlinks:   Short Term Disability / DUC [employer] / 2024  -Click Acknowledge located in the top right ribbon/menu (it  has been moved)  -Drag the mouse into the blank space of the document and a + sign will   appear. Left click to electronically sign the document.     Thank you for your time and assistance!     Cheyanne Barton Department

## 2024-05-17 ENCOUNTER — TELEPHONE (OUTPATIENT)
Dept: PHYSICAL THERAPY | Facility: HOSPITAL | Age: 64
End: 2024-05-17

## 2024-05-20 NOTE — TELEPHONE ENCOUNTER
Short term disability forms have been completed, and I have sent the forms as an attachment onto patient's Telly message, due to missing authorization for Madhu Guaman.

## 2024-05-21 ENCOUNTER — OFFICE VISIT (OUTPATIENT)
Dept: PHYSICAL THERAPY | Age: 64
End: 2024-05-21
Attending: NURSE PRACTITIONER

## 2024-05-21 DIAGNOSIS — M25.571 ACUTE RIGHT ANKLE PAIN: ICD-10-CM

## 2024-05-21 DIAGNOSIS — M25.471 RIGHT ANKLE SWELLING: Primary | ICD-10-CM

## 2024-05-21 PROCEDURE — 97161 PT EVAL LOW COMPLEX 20 MIN: CPT

## 2024-05-21 PROCEDURE — 97112 NEUROMUSCULAR REEDUCATION: CPT

## 2024-05-21 NOTE — PROGRESS NOTES
LOWER EXTREMITY EVALUATION:     Diagnosis:   Right ankle swelling (M25.471), right ankle pain (M25.571)   Referring Provider: Martina ROLAND Date of Evaluation:    5/21/2024    Precautions:   osteoporosis Next MD visit:   none scheduled  Date of Surgery: n/a  Symptom onset; 5-1-24     PATIENT SUMMARY   Ryley Guzman is a 63 year old female who presents to therapy today with complaints of R ankle pain and swelling past 3 wks, after taking a long walk in the park. She does not recall twisting the ankle or anything unusual. She does report previous R ankle sprains, most recently 2 yrs ago, resolved independently in ~1 week. Pt recalls closed R ankle dislocation >20 yrs ago, that was manually reduced, she recovered without any resulting surgery. Was given Medrol dosepak 5-8-24 which helped a little bit, has also been icing. She has been wearing a soft ankle wrap brace which helps. Aggravated c sudden movements, walking/standing for long periods. Feels stiff/uncomfortable c movement but not marked pain. Pt denies any back pain or problems. Denies N/T R foot. Denies any falls. Able to still do yoga.    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, x3/wk.  Pt describes pain level at best 0/10, at worst 1/10.   Current functional limitations include decr walking tolerance, persistent swelling despite recent pain reduction, pain c sudden movements.     Matylda describes prior level of function treadmill walking on 10deg incline. Pt goals include resolve swelling, return to long walks and work shifts without pain or swelling.  Past medical history was reviewed with Ryley. Significant findings include osteoporosis, anxiety, GERD, hiatal hernia    ASSESSMENT  Ryley presents to physical therapy evaluation with primary c/o R ankle swelling and pain. The results of the objective tests and measures show measurable edema transmalleolar R ankle vs L, resulting mild decr in ankle dorsiflexion vs  uninvolved side, impaired gait/balance/stairs.  Functional deficits include but are not limited to decr walking tolerance, pain c sudden movements.  Signs and symptoms are consistent with rehab diagnosis, c decr R ankle proprioception likely related to chronic damage from old ankle dislocation. Pt and PT discussed evaluation findings, pathology, POC and HEP.  Discussed importance of supportive footwear for stability to assist in stabilization and thus help decr her swelling. Pt voiced understanding and performs HEP correctly without reported pain. Skilled Physical Therapy is medically necessary to address the above impairments and reach functional goals.     OBJECTIVE:   Observation: mild swelling noted lateral malleolus  Palpation: TTP R ATFL and deltoid ligament, medial malleolus, non-TTP plantar fascia  Sensation: denies N/T    AROM: (* denotes performed with pain)  @eval:   Ankle DF: R 10; L 18  Ankle PF: R 55; L 54  Ankle INV: R 30; L 35  Ankle EV: R 7; L 20     Accessory motion: AP TC jt mild hypermobile Rt    Strength/MMT: (* denotes performed with pain)  @eval:   Ankle DF: R 5/5; L 5/5  Ankle PF: R 5/5; L 5/5  Ankle INV: R 5/5; L 5/5  Ankle EV: R 5/5; L 5/5     Special tests:   Girth: xmalleolar- R 24.3cm/ L 23.1cm    Gait: pt ambulates on level ground with  minimal decr stance time RLE, good speed, soft ankle brace and supportive gym shoes, no assistive device  Stairs: ascends reciprocal no railing, descends reciprocal c minimal decr eccentric control R vs L    Balance:   Tandem stance: R-retro >20 sec ; L-retro >20 sec c mild sway B  SLS: R 3* sec, L >20 sec    Imagin24 RIGHT ANKLE XR  FINDINGS:  No evidence of acute displaced fracture or dislocation.  Normal mineralization.  Moderate circumferential soft tissue swelling.  Ankle mortise intact.  Moderate plantar and dorsal calcaneal spurring.  Probable chronic posttraumatic deformity of the lower aspect of the right tibial diaphysis.   CONCLUSION:   No evidence of acute displaced fracture or dislocation in the right ankle.  Soft tissue swelling.     Today’s Treatment and Response:   Pt education was provided on exam findings, treatment diagnosis, treatment plan, expectations, and prognosis. Pt was also provided recommendations for activity modifications, modalities as needed [ice/heat], importance of remaining active, and shoe wear.  Patient was instructed in and issued a HEP for:   Review of self-guided ex's: HR's, ankle alphabet  Access Code: [email or web address] ; URL: [email or web address] ; Prepared by: Helen Lea  Date: 05/21/2024  Exercises  - Isometric Ankle Inversion  - 2 x daily - 10 reps - 5 hold  - Isometric Ankle Eversion with Self Resistance  - 2 x daily - 10 reps - 5 hold  - Tandem Stance  - 2 x daily - 2 reps - 1 minutes  Continue frequent icing and compression, supportive footwear for stability, intermittent use of ankle brace PRN for longer bouts of walking    Charges: PT Eval Low Complexity, NM re-ed-1      Total Timed Treatment: 15 min     Total Treatment Time: 40 min     Based on clinical rationale and outcome measures, this evaluation involved Low Complexity decision making.   PLAN OF CARE:    Goals: (to be met in 8 visits)  Pt can consistently resume long walks, work shifts, and pivot/change of direction c little to no pain for incr functional mobility  Consistently resolved swelling R ankle to allow painfree walking  Pt demo improved R SLS stability s pain to promote symptom   Pt demo improved dynamic stability c reciprocal descending stairs c good form and control s railing  Indep HEP to promote cont progress toward functional goals    Frequency / Duration: Patient will be seen for 1-2 x/week or a total of 8 visits over a 90 day period. Treatment will include: Manual Therapy, Neuromuscular Re-education, Therapeutic Exercise, and Home Exercise Program instruction    Education or treatment limitation:  hx of dislocation s  surgical repair  Rehab Potential:good    LEFS Score  LEFS Score: 98.75 % (5/17/2024  6:17 PM)      Patient/Family/Caregiver was advised of these findings, precautions, and treatment options and has agreed to actively participate in planning and for this course of care.    Thank you for your referral. Please co-sign or sign and return this letter via fax as soon as possible to [phone]. If you have any questions, please contact me at Dept: [phone]    Sincerely,  Electronically signed by therapist: Helen Lea PT  Physician's certification required: Yes  I certify the need for these services furnished under this plan of treatment and while under my care.    X___________________________________________________ Date____________________    Certification From: 5/21/2024  To:8/19/2024

## 2024-05-23 ENCOUNTER — OFFICE VISIT (OUTPATIENT)
Dept: PHYSICAL THERAPY | Age: 64
End: 2024-05-23
Attending: NURSE PRACTITIONER

## 2024-05-23 ENCOUNTER — OFFICE VISIT (OUTPATIENT)
Dept: FAMILY MEDICINE CLINIC | Facility: CLINIC | Age: 64
End: 2024-05-23

## 2024-05-23 VITALS
RESPIRATION RATE: 16 BRPM | SYSTOLIC BLOOD PRESSURE: 110 MMHG | HEIGHT: 63 IN | OXYGEN SATURATION: 98 % | WEIGHT: 126 LBS | HEART RATE: 84 BPM | DIASTOLIC BLOOD PRESSURE: 64 MMHG | BODY MASS INDEX: 22.32 KG/M2

## 2024-05-23 DIAGNOSIS — M25.471 RIGHT ANKLE SWELLING: Primary | ICD-10-CM

## 2024-05-23 DIAGNOSIS — M25.571 ACUTE RIGHT ANKLE PAIN: ICD-10-CM

## 2024-05-23 PROCEDURE — 97110 THERAPEUTIC EXERCISES: CPT

## 2024-05-23 PROCEDURE — 3008F BODY MASS INDEX DOCD: CPT | Performed by: NURSE PRACTITIONER

## 2024-05-23 PROCEDURE — 97140 MANUAL THERAPY 1/> REGIONS: CPT

## 2024-05-23 PROCEDURE — 3074F SYST BP LT 130 MM HG: CPT | Performed by: NURSE PRACTITIONER

## 2024-05-23 PROCEDURE — 99214 OFFICE O/P EST MOD 30 MIN: CPT | Performed by: NURSE PRACTITIONER

## 2024-05-23 PROCEDURE — 3078F DIAST BP <80 MM HG: CPT | Performed by: NURSE PRACTITIONER

## 2024-05-23 NOTE — PROGRESS NOTES
PT DAILY NOTE  Diagnosis:   Right ankle swelling (M25.471), right ankle pain (M25.571)   Referring Provider: Martina ROLAND Date of Evaluation:    5/21/2024    Precautions:  osteoporosis Next MD visit:   none scheduled  Date of Surgery: n/a  Symptom onset; 5-1-24     Insurance Primary/Secondary: [employer] II O     Visit 2 of 5   Date POC Expires: 7-31-24       Subjective: Pt states she did not bring her Dansko's, but has purchased new supportive athletic shoes.  Pain: 0/10   @eval: Ryley Guzman is a 63 year old female who presents to therapy today with complaints of R ankle pain and swelling past 3 wks, after taking a long walk in the park. She does not recall twisting the ankle or anything unusual. She does report previous R ankle sprains, most recently 2 yrs ago, resolved independently in ~1 week. Pt recalls closed R ankle dislocation >20 yrs ago, that was manually reduced, she recovered without any resulting surgery. Was given Medrol dosepak 5-8-24 which helped a little bit, has also been icing. She has been wearing a soft ankle wrap brace which helps. Aggravated c sudden movements, walking/standing for long periods. Feels stiff/uncomfortable c movement but not marked pain. Pt denies any back pain or problems. Denies N/T R foot. Denies any falls. Able to still do yoga.  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  Pt describes pain level at best 0/10, at worst 1/10.   Current functional limitations include decr walking tolerance, persistent swelling despite recent pain reduction, pain c sudden movements.   Maria Luzda describes prior level of function treadmill walking on 10deg incline. Pt goals include resolve swelling, return to long walks and work shifts without pain or swelling.  Past medical history was reviewed with Ryley. Significant findings include osteoporosis, anxiety, GERD, hiatal hernia    Objective:   Non-TTP today med or lateral malleolus, ATFL, deltoid  ligament  Swelling is focused overlying R lateral malleolus  Pt presents in [employer]OK[employer] shoes today, no ankle instability noted c ambulation  Pt noted tendency to supinate foot in SLS, cued to engage intrinsic foot muscles  Mild decreased R calf musculature vs L, as well as some vein verocosity noted R calf    Imagin24 RIGHT ANKLE XR  FINDINGS:  No evidence of acute displaced fracture or dislocation.  Normal mineralization.  Moderate circumferential soft tissue swelling.  Ankle mortise intact.  Moderate plantar and dorsal calcaneal spurring.  Probable chronic posttraumatic deformity of the lower aspect of the right tibial diaphysis.   CONCLUSION:  No evidence of acute displaced fracture or dislocation in the right ankle.  Soft tissue swelling.     Treatment:  (\"NP\" indicates Not Performed this date)  Manual Therapy:  Added retrograde STM R foot/ankle/calf    Neuromuscular Re-education:    Therapeutic Exercise: Eval 24   NuStep for CKC strengthening and ankle ROM  5min    R ankle inversion isometric vs ball 5\"x10 5\"x10    R ankle eversion isometric self-resist 5\"x10 5\"x10    Tandem balance demo  1'ea   R SLS  10\"x10    R SLS c LLE fwd/lat/retro    x10ea   BalBoard controlled taps ant-post   2min   BalBoard controlled taps med-lat   2min    seated wobbleboard cw/ccw    x10ea            next session: ashley hagan    HEP:  Access Code: [email or web address] ; URL: [email or web address] ; Prepared by: Helen Lea  Date: 2024  Exercises  - Isometric Ankle Inversion  - 2 x daily - 10 reps - 5 hold  - Isometric Ankle Eversion with Self Resistance  - 2 x daily - 10 reps - 5 hold  - Tandem Stance  - 2 x daily - 2 reps - 1 minutes  Continue frequent icing and compression, supportive footwear for stability, intermittent use of ankle brace PRN for longer bouts of walking  Review of self-guided ex's: HR's, ankle alphabet  : ADD - Single Leg Stance  - 2 x daily - 10 reps - 10  hold    Assessment/Plan: Pt tolerated several new ex's today to promote improved proprioception and balance today. Difficulty was noted; pt cued to engage intrinsic foot musculature to assist. Non-TTP med/lat ankle today which was present last visit. Swelling focused overlying lateral malleolus. HEP addition noted above. Cont to progress R ankle stability as toelrated.    PLAN OF CARE:    Goals: (to be met in 8 visits)  Pt can consistently resume long walks, work shifts, and pivot/change of direction c little to no pain for incr functional mobility  Consistently resolved swelling R ankle to allow painfree walking  Pt demo improved R SLS stability s pain to promote symptom   Pt demo improved dynamic stability c reciprocal descending stairs c good form and control s railing  Indep HEP to promote cont progress toward functional goals    Frequency / Duration: Patient will be seen for 1-2 x/week or a total of 8 visits over a 90 day period.     All Treatments performed at distinct and separate times during the therapy session.  Treatment Minutes  Units charged   Manual Therapy 10 minutes 1   Therapeutic Activity 0 minutes    Neuromuscular Re-education 0 minutes    Therapeutic Exercise 30 minutes 2   Total Direct Treatment Time 40 minutes

## 2024-05-23 NOTE — PROGRESS NOTES
Chief Complaint   Patient presents with    Follow - Up     HPI:   Patient is here follow up on R ankle swelling , reports doing better , have been going with physical therapy.   Going back to work on 5/28/24.    5/8/24  Ryley Guzman is a 63 year old female present with complain of  R ankle  swelling   Time of injury: no   Location: R ankle   Nature/type of injury: none /walking   Able to bear weight: yes   Pain described as - sharp/stabbing and throbbing.  Pain rated at - 5/10  Pain severity now is moderate.  Radiation - none   Pain is aggravated by movement, use and palpation  Pain is alleviated by rest, immobilization, and elevation  Symptoms associated with pain include none.  Care prior to arrival consisted of rest, elevation, and ice, with minimal relief.   Prior history of ankle injury/sprain/fracture: yes sprain     No current outpatient medications on file.      Past Medical History:    Depression    Depressive disorder, not elsewhere classified    Normal delivery (HCC)      Social History:  Social History     Socioeconomic History    Marital status:    Tobacco Use    Smoking status: Never    Smokeless tobacco: Never    Tobacco comments:     quit 1`0 yrs ago   Vaping Use    Vaping status: Never Used   Substance and Sexual Activity    Alcohol use: No    Drug use: No   Other Topics Concern    Caffeine Concern Yes    Exercise No    Seat Belt Yes   Social History Narrative    ** Merged History Encounter **               REVIEW OF SYSTEMS:     Positive for R  ankle injury/sprain  Other systems are as noted in HPI  All other systems reviewed and negative except as noted above      EXAM:   /64   Pulse 84   Resp 16   Ht 5' 3\" (1.6 m)   Wt 126 lb (57.2 kg)   SpO2 98%   BMI 22.32 kg/m²   GENERAL: well developed, well nourished,in no apparent distress  SKIN: no rashes,no suspicious lesions  LUNGS: clear to auscultation  CARDIO: RRR without murmur  EXTREMITIES:   R ankle exam:   -  defect/deformity : no   - swelling/effusion: yes   - tenderness over medial malleolus: yes   - tenderness over lateral malleolus: yes   - tenderness over ATFL: no   - tenderness over CFL: no   - tenderness over head of fibula: no   - pain with range of motion: yes   - joint laxity: no   - crepitus: no   - bruising/ecchymosis: no   - rest of foot exam: Normal   - pedal pulses: Intact  - sensations: intact  - good cap refill     ASSESSMENT AND PLAN:       PLAN:   Diagnoses and all orders for this visit:    Right ankle swelling     Stable   F/u for worsening symptoms          R ankle X-ray --> CONCLUSION:  No evidence of acute displaced fracture or dislocation in the right ankle.  Soft tissue swelling.   Rest, ice application followed by heat. Elevate extremity.  NSAIDs/Tylenol for pain   Apply Ace wrap with ambulation. Take Ace wrap off at night before bedtime  Avoid physical activity for now and possibly for next 7-10 days.  Resume activities as tolerated.  Follow up with pcp if not better in 7-10 days

## 2024-05-29 NOTE — PROGRESS NOTES
PT DAILY NOTE  Diagnosis:   Right ankle swelling (M25.471), right ankle pain (M25.571)   Referring Provider: Martina ROLAND Date of Evaluation:    5/21/2024    Precautions:  osteoporosis Next MD visit:   none scheduled  Date of Surgery: n/a  Symptom onset; 5-1-24     Insurance Primary/Secondary: BCBS II O     Visit 3 of 5   Date POC Expires: 7-31-24       Subjective: Pt states still stubborn swelling in ankle but no pain. She soaks her ankle nightly in sour milk which she sees an improvement in swelling afterward.  Pain: 0/10   @eval: Ryley Guzman is a 63 year old female who presents to therapy today with complaints of R ankle pain and swelling past 3 wks, after taking a long walk in the park. She does not recall twisting the ankle or anything unusual. She does report previous R ankle sprains, most recently 2 yrs ago, resolved independently in ~1 week. Pt recalls closed R ankle dislocation >20 yrs ago, that was manually reduced, she recovered without any resulting surgery. Was given Medrol dosepak 5-8-24 which helped a little bit, has also been icing. She has been wearing a soft ankle wrap brace which helps. Aggravated c sudden movements, walking/standing for long periods. Feels stiff/uncomfortable c movement but not marked pain. Pt denies any back pain or problems. Denies N/T R foot. Denies any falls. Able to still do yoga.  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  Pt describes pain level at best 0/10, at worst 1/10.   Current functional limitations include decr walking tolerance, persistent swelling despite recent pain reduction, pain c sudden movements.   Matylda describes prior level of function treadmill walking on 10deg incline. Pt goals include resolve swelling, return to long walks and work shifts without pain or swelling.  Past medical history was reviewed with Ryley. Significant findings include osteoporosis, anxiety, GERD, hiatal hernia    Objective:   Swelling  is focused overlying R lateral malleolus as well as medial malleolus, non-TTP    Imagin24 RIGHT ANKLE XR  FINDINGS:  No evidence of acute displaced fracture or dislocation.  Normal mineralization.  Moderate circumferential soft tissue swelling.  Ankle mortise intact.  Moderate plantar and dorsal calcaneal spurring.  Probable chronic posttraumatic deformity of the lower aspect of the right tibial diaphysis.   CONCLUSION:  No evidence of acute displaced fracture or dislocation in the right ankle.  Soft tissue swelling.     Treatment:  (\"NP\" indicates Not Performed this date)  Manual Therapy:  retrograde STM R foot/ankle/calf  Added trial Thacker taping support to lateral ankle    Neuromuscular Re-education:    Therapeutic Exercise: Eval 24   NuStep for CKC strengthening and ankle ROM  5min     Recumbent bike for CKC strengthening and ankle ROM   5min   Gastroc stretch on slantboard   1'   R ankle inversion isometric vs ball 5\"x10 5\"x10  5\"x15   R ankle eversion isometric self-resist 5\"x10 5\"x10  5\"x15   Tandem balance demo  1'ea 1'ea   R SLS  10\"x10     R SLS c LLE fwd/lat/retro    x10ea    BalBoard controlled taps ant-post   2min 2min   BalBoard controlled taps med-lat   2min 2min    seated wobbleboard cw/ccw    x10ea     airex march     2min   Seated towel scrunches   2min                 HEP:  Access Code: [email or web address] ; URL: [email or web address] ; Prepared by: Helen Lea  Date: 2024  Exercises  - Isometric Ankle Inversion  - 2 x daily - 10 reps - 5 hold  - Isometric Ankle Eversion with Self Resistance  - 2 x daily - 10 reps - 5 hold  - Tandem Stance  - 2 x daily - 2 reps - 1 minutes  Continue frequent icing and compression, supportive footwear for stability, intermittent use of ankle brace PRN for longer bouts of walking  Review of self-guided ex's: HR's, ankle alphabet  : ADD - Single Leg Stance  - 2 x daily - 10 reps - 10 hold  : add - Seated  Toe Towel Scrunches  - 2 x daily - 2 minutes    Assessment/Plan: Pt has no pain today but does have persistent swelling circumferentially R ankle. She has been wearing light ankle wrap and when at work also wears compression stocking. Applied lateral ankle taping today to promote proprioception, blood flow and extrinsic support. Added march on airex which pt visibly rolled slightly into R ankle inversion c each step, which was not shown on L side. Added seated intrinsic foot strengthening to improve contribution of these muscles to stability and balance. Continue to progress as tolerated.     PLAN OF CARE:    Goals: (to be met in 8 visits)  Pt can consistently resume long walks, work shifts, and pivot/change of direction c little to no pain for incr functional mobility  Consistently resolved swelling R ankle to allow painfree walking  Pt demo improved R SLS stability s pain to promote symptom   Pt demo improved dynamic stability c reciprocal descending stairs c good form and control s railing  Indep HEP to promote cont progress toward functional goals    Frequency / Duration: Patient will be seen for 1-2 x/week or a total of 8 visits over a 90 day period.     All Treatments performed at distinct and separate times during the therapy session.  Treatment Minutes  Units charged   Manual Therapy 10 minutes 1   Therapeutic Activity 0 minutes    Neuromuscular Re-education 0 minutes    Therapeutic Exercise 35 minutes 2   Total Direct Treatment Time 45 minutes

## 2024-05-30 ENCOUNTER — OFFICE VISIT (OUTPATIENT)
Dept: PHYSICAL THERAPY | Age: 64
End: 2024-05-30
Attending: NURSE PRACTITIONER

## 2024-05-30 DIAGNOSIS — M25.471 RIGHT ANKLE SWELLING: Primary | ICD-10-CM

## 2024-05-30 DIAGNOSIS — M25.571 ACUTE RIGHT ANKLE PAIN: ICD-10-CM

## 2024-05-30 PROCEDURE — 97110 THERAPEUTIC EXERCISES: CPT

## 2024-05-30 PROCEDURE — 97140 MANUAL THERAPY 1/> REGIONS: CPT

## 2024-05-31 NOTE — PROGRESS NOTES
PT DAILY NOTE  Diagnosis:   Right ankle swelling (M25.471), right ankle pain (M25.571)   Referring Provider: Martina ROLAND Date of Evaluation:    2024    Precautions:  osteoporosis Next MD visit:   none scheduled  Date of Surgery: n/a  Symptom onset; 24     Insurance Primary/Secondary: BCBS II O     Visit 4 of 5   Date POC Expires: 24       Subjective: Pt states she feels the taping last time helped with swelling.  Pain: 0/10   @eval: Ryley Guzman is a 63 year old female who presents to therapy today with complaints of R ankle pain and swelling past 3 wks, after taking a long walk in the park. She does not recall twisting the ankle or anything unusual. She does report previous R ankle sprains, most recently 2 yrs ago, resolved independently in ~1 week. Pt recalls closed R ankle dislocation >20 yrs ago, that was manually reduced, she recovered without any resulting surgery. Was given Medrol dosepak 24 which helped a little bit, has also been icing. She has been wearing a soft ankle wrap brace which helps. Aggravated c sudden movements, walking/standing for long periods. Feels stiff/uncomfortable c movement but not marked pain. Pt denies any back pain or problems. Denies N/T R foot. Denies any falls. Able to still do yoga.  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  Pt describes pain level at best 0/10, at worst 1/10.   Current functional limitations include decr walking tolerance, persistent swelling despite recent pain reduction, pain c sudden movements.   Ryley describes prior level of function treadmill walking on 10deg incline. Pt goals include resolve swelling, return to long walks and work shifts without pain or swelling.  Past medical history was reviewed with Ryley. Significant findings include osteoporosis, anxiety, GERD, hiatal hernia    Objective:     Imagin24 RIGHT ANKLE XR  FINDINGS:  No evidence of acute displaced fracture or  dislocation.  Normal mineralization.  Moderate circumferential soft tissue swelling.  Ankle mortise intact.  Moderate plantar and dorsal calcaneal spurring.  Probable chronic posttraumatic deformity of the lower aspect of the right tibial diaphysis.   CONCLUSION:  No evidence of acute displaced fracture or dislocation in the right ankle.  Soft tissue swelling.     Treatment:  (\"NP\" indicates Not Performed this date)  Manual Therapy:  retrograde STM R foot/ankle  Thacker taping support to lateral ankle    Neuromuscular Re-education:    Therapeutic Exercise: Eval 5-21-24    5-23-24   5-30-24   6-3-24   NuStep for CKC strengthening and ankle ROM  5min      Recumbent bike for CKC strengthening and ankle ROM   5min 5min   Gastroc stretch on slantboard   1' 1'   R ankle inversion isometric vs ball 5\"x10 5\"x10  5\"x15    R ankle eversion isometric self-resist 5\"x10 5\"x10  5\"x15    Seated wobbleboard  ant-post    x20   Seated wobbleboard cw/ccw  x10ea  x10ea   Tandem balance demo  1'ea 1'ea 1'   R SLS  10\"x10   10\"x10   R SLS c LLE fwd/lat/retro    x10ea     BalBoard controlled taps ant-post   2min 2min 2min   BalBoard controlled taps med-lat   2min 2min 2min    airex march     2min 2min   Seated towel scrunches   2min    Sidestep c tband at feet for ankle eversion    Y1min            HEP:  Access Code: [email or web address] ; URL: [email or web address] ; Prepared by: Helen Lea  Date: 05/21/2024  Exercises  - Isometric Ankle Inversion  - 2 x daily - 10 reps - 5 hold  - Isometric Ankle Eversion with Self Resistance  - 2 x daily - 10 reps - 5 hold  - Tandem Stance  - 2 x daily - 2 reps - 1 minutes  Continue frequent icing and compression, supportive footwear for stability, intermittent use of ankle brace PRN for longer bouts of walking  Review of self-guided ex's: HR's, ankle alphabet  5-23: ADD - Single Leg Stance  - 2 x daily - 10 reps - 10 hold  5-30: add - Seated Toe Towel Scrunches  - 2 x daily - 2  minutes  6-3: add sidestep c tband at ankles for ankle eversion    Assessment/Plan: Pt reports mild improvement c lateral ankle taping applied last session, repeated today. She continues to have difficulty and tendency for ankle inversion/supination c balance activities. Added sidestepping c tband at feet to also include ankle eversion strength, pt to add to HEP as well. Continue to address deficits. Progress note next visit.     PLAN OF CARE:    Goals: (to be met in 8 visits)  Pt can consistently resume long walks, work shifts, and pivot/change of direction c little to no pain for incr functional mobility  Consistently resolved swelling R ankle to allow painfree walking  Pt demo improved R SLS stability s pain to promote symptom   Pt demo improved dynamic stability c reciprocal descending stairs c good form and control s railing  Indep HEP to promote cont progress toward functional goals    Frequency / Duration: Patient will be seen for 1-2 x/week or a total of 8 visits over a 90 day period.     All Treatments performed at distinct and separate times during the therapy session.  Treatment Minutes  Units charged   Manual Therapy 10 minutes 1   Therapeutic Activity 0 minutes    Neuromuscular Re-education 0 minutes    Therapeutic Exercise 30 minutes 2   Total Direct Treatment Time 40 minutes

## 2024-06-03 ENCOUNTER — OFFICE VISIT (OUTPATIENT)
Dept: PHYSICAL THERAPY | Age: 64
End: 2024-06-03
Attending: NURSE PRACTITIONER
Payer: COMMERCIAL

## 2024-06-03 DIAGNOSIS — M25.471 RIGHT ANKLE SWELLING: Primary | ICD-10-CM

## 2024-06-03 DIAGNOSIS — M25.571 ACUTE RIGHT ANKLE PAIN: ICD-10-CM

## 2024-06-03 PROCEDURE — 97140 MANUAL THERAPY 1/> REGIONS: CPT

## 2024-06-03 PROCEDURE — 97110 THERAPEUTIC EXERCISES: CPT

## 2024-06-04 NOTE — PROGRESS NOTES
Discharge Summary  Pt has attended 5 visits in Physical Therapy.     Diagnosis:   Right ankle swelling (M25.471), right ankle pain (M25.571)   Referring Provider: Martina ROLAND Date of Evaluation:    5/21/2024    Precautions:  osteoporosis Next MD visit:   none scheduled  Date of Surgery: n/a  Symptom onset; 5-1-24     Insurance Primary/Secondary: BCBS II O     Visit 5 of 5   Date POC Expires: 7-31-24       Subjective: Pt states her R ankle swelling is improving slowly. She feels the taping helps.   Pain: 0/10   Assessment/Plan: Pt has structural laxity R ankle due to ankle dislocation many years ago. She no longer has any ankle pain, but continues to have some mild transmalleolar swelling about the ankle. We have discussed various methods of compression including taping, and proper supportive footwear as modifications she can make. We have also worked on targeted ex's to increase intrinsic foot strength and proprioception training in order to improve her ankle's ability to stabilize itself on uneven footing. She is showing improvement in proprioception since starting PT. At this time, she is appropriate to transition to self-management c HEP. HEP progressed and reviewed c pt today c updated handout given. Pt in agreement c this plan and all questions answered. Pt advised if her swelling does not gradually resolve with these methods to consider podiatry consult for other medical management.    @eval: Ryley Guzman is a 63 year old female who presents to therapy today with complaints of R ankle pain and swelling past 3 wks, after taking a long walk in the park. She does not recall twisting the ankle or anything unusual. She does report previous R ankle sprains, most recently 2 yrs ago, resolved independently in ~1 week. Pt recalls closed R ankle dislocation >20 yrs ago, that was manually reduced, she recovered without any resulting surgery. Was given Medrol dosepak 5-8-24 which helped a little bit, has  also been icing. She has been wearing a soft ankle wrap brace which helps. Aggravated c sudden movements, walking/standing for long periods. Feels stiff/uncomfortable c movement but not marked pain. Pt denies any back pain or problems. Denies N/T R foot. Denies any falls. Able to still do yoga.  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  Pt describes pain level at best 0/10, at worst 1/10.   Current functional limitations include decr walking tolerance, persistent swelling despite recent pain reduction, pain c sudden movements.   Matylda describes prior level of function treadmill walking on 10deg incline. Pt goals include resolve swelling, return to long walks and work shifts without pain or swelling.  Past medical history was reviewed with Ryley. Significant findings include osteoporosis, anxiety, GERD, hiatal hernia    Objective:   Observation: mild swelling noted medial/lateral malleolus  Palpation: non-TTP ATFL and deltoid ligament (vs eval: TTP R ATFL and deltoid ligament, medial malleolus, non-TTP plantar fascia)     AROM: (* denotes performed with pain)  @eval: 6-5-24   Ankle DF: R 10; L 18  Ankle PF: R 55; L 54  Ankle INV: R 30; L 35  Ankle EV: R 7; L 20  R 12   R 53   R 32   R 15      Accessory motion: AP TC jt mild hypermobile Rt     Strength/MMT: (* denotes performed with pain)  @eval:   Ankle DF: R 5/5; L 5/5  Ankle PF: R 5/5; L 5/5  Ankle INV: R 5/5; L 5/5  Ankle EV: R 5/5; L 5/5      Special tests:   Girth: xmalleolar- R 24.8cm (vs eval: R 24.3cm/ L 23.1cm)     Gait: pt amb s gait deviation (vs eval; pt ambulates on level ground with  minimal decr stance time RLE, good speed, soft ankle brace and supportive gym shoes, no assistive device)  Stairs: ascends/descends reciprocal no railing, no pain, good form and control (vs eval: ascends reciprocal no railing, descends reciprocal c minimal decr eccentric control R vs L)     Balance:   Tandem stance: R-retro >20 sec ; L-retro  >20 sec c mild sway B @eval  SLS: R 10 sec (vs eval: R 3* sec, L >20 sec)     Imagin24 RIGHT ANKLE XR  FINDINGS:  No evidence of acute displaced fracture or dislocation.  Normal mineralization.  Moderate circumferential soft tissue swelling.  Ankle mortise intact.  Moderate plantar and dorsal calcaneal spurring.  Probable chronic posttraumatic deformity of the lower aspect of the right tibial diaphysis.   CONCLUSION:  No evidence of acute displaced fracture or dislocation in the right ankle.  Soft tissue swelling.     Treatment:  (\"NP\" indicates Not Performed this date)  Manual Therapy:  retrograde STM R foot/ankle  Thacker taping support to lateral ankle    Neuromuscular Re-education:    Therapeutic Exercise: Eval 5-21-24    5-23-24   5-30-24   6-3-24   6-5-24   NuStep for CKC strengthening and ankle ROM  5min       Recumbent bike for CKC strengthening and ankle ROM   5min 5min 5min   Gastroc stretch on slantboard   1' 1'    R ankle inversion isometric vs ball 5\"x10 5\"x10  5\"x15     R ankle eversion isometric self-resist 5\"x10 5\"x10  5\"x15     Seated wobbleboard  ant-post    x20    Seated wobbleboard cw/ccw  x10ea  x10ea    Tandem balance demo  1'ea 1'ea 1' 1'   R SLS  10\"x10   10\"x10    R SLS c LLE fwd/lat/retro    x10ea   2x10ea   BalBoard controlled taps ant-post   2min 2min 2min    BalBoard controlled taps med-lat   2min 2min 2min     airex march     2min 2min    Seated towel scrunches   2min  2min   Sidestep c tband at feet for ankle eversion    Y1min Y2min   Reassessment, HEP advancement and review     15min     HEP:  Access Code: [email or web address] ; URL: [email or web address] ; Prepared by: Helen Lea  Date: 2024  Access Code: [email or web address]  URL: [email or web address]  Date: 2024  Prepared by: Helen Lea    Exercises  - Isometric Ankle Inversion  - 2 x daily - 10 reps - 5 hold  - Isometric Ankle Eversion with Self Resistance  - 2 x daily - 10 reps - 5  hold  - Tandem Stance  - 2 x daily - 2 reps - 1 minutes  - Single Leg Stance  - 2 x daily - 10 reps - 10 hold  - Seated Toe Towel Scrunches  - 1 x daily - 2 sets - 10 reps - 2 minutes  - Side Stepping with Resistance at Feet  - 1 x daily - 2 reps - 1 minute  - Leg Swing Single Leg Balance  - 1 x daily - 2 sets - 20 reps  - Single Leg Balance with Trunk Rotation  - 1 x daily - 3 reps - 30 hold    PLAN OF CARE:    Goals: (to be met in 8 visits)  Pt can consistently resume long walks, work shifts, and pivot/change of direction c little to no pain for incr functional mobility--progressing 6-5-24  Consistently resolved swelling R ankle to allow painfree walking--not met 6-5-24  Pt demo improved R SLS stability s pain to promote decr symptom --met 6-5-24  Pt demo improved dynamic stability c reciprocal descending stairs c good form and control s railing--met 6-5-24  Indep HEP to promote cont progress toward functional --met 6-5-24    All Treatments performed at distinct and separate times during the therapy session.  Treatment Minutes  Units charged   Manual Therapy 8 minutes 1   Therapeutic Activity 0 minutes    Neuromuscular Re-education 0 minutes    Therapeutic Exercise 35 minutes 2   Total Direct Treatment Time 43 minutes      Post LEFS Score  Post LEFS Score: 98.75 % (6/5/2024  1:11 PM)    0 % improvement    Plan: d /c PT to indep HEP    Patient/Family/Caregiver was advised of these findings, precautions, and treatment options and has agreed to actively participate in planning and for this course of care.    Thank you for your referral. If you have any questions, please contact me at Dept: [phone].    Sincerely,  Electronically signed by therapist: Helen Lea, PT     Physician's certification required:  Yes  Please co-sign or sign and return this letter via fax as soon as possible to [phone].   I certify the need for these services furnished under this plan of treatment and while under my  care.    X___________________________________________________ Date____________________    Certification From: 6/4/2024  To:9/2/2024

## 2024-06-05 ENCOUNTER — OFFICE VISIT (OUTPATIENT)
Dept: PHYSICAL THERAPY | Age: 64
End: 2024-06-05
Attending: NURSE PRACTITIONER
Payer: COMMERCIAL

## 2024-06-05 DIAGNOSIS — M25.571 ACUTE RIGHT ANKLE PAIN: ICD-10-CM

## 2024-06-05 DIAGNOSIS — M25.471 RIGHT ANKLE SWELLING: Primary | ICD-10-CM

## 2024-06-05 PROCEDURE — 97110 THERAPEUTIC EXERCISES: CPT

## 2024-06-05 PROCEDURE — 97140 MANUAL THERAPY 1/> REGIONS: CPT

## (undated) NOTE — Clinical Note
2017    Patient: Neftaly Abreu  : 1960 Visit date: 2017    Dear  Dr. Gelacio Dowling MD,    Thank you for referring Neftaly Abreu to my practice. Please find my assessment and plan below.         Assessment   Chronic heartburn  (primary e

## (undated) NOTE — LETTER
Date: 6/4/2020    Patient Name: Magdiel Hair          To Whom it may concern: The above patient was seen at the [facility] for treatment of a medical condition.     This patient should be excused from attending work  from 6/6/20 through

## (undated) NOTE — Clinical Note
2017    Patient: Neo Serra  : 1960 Visit date: 2017    Dear  Dr. Nova Good MD,    Thank you for referring Neo Serra to my practice. Please find my assessment and plan below.            Assessment   Encounter for screening co

## (undated) NOTE — LETTER
Date: 5/8/2024    Patient Name: Ryley Guzman          To Whom it may concern:    The above patient was seen at [facility] for treatment of a medical condition.    This patient should be excused from attending work/school..    The patient may return to work/school on 5/28/2024.        Sincerely,    ASUNCION Crenshaw

## (undated) NOTE — Clinical Note
2017    Patient: Corona Fernandez  : 1960 Visit date: 2017    Dear  Dr. Lavinia Hoang MD,    Thank you for referring Corona Fernandez to my practice. Please find my assessment and plan below.            Assessment   Encounter for screening co

## (undated) NOTE — LETTER
Date: 6/16/2020    Patient Name: Eladio Polanco          To Whom it may concern: This letter has been written at the patient's request. The above patient was seen at the [facility] for treatment of a medical condition.       The patient ma

## (undated) NOTE — MR AVS SNAPSHOT
[address] [address]  [phone]               Thank you for choosing us for your health care visit with Maria Del Rosario Peterson MD.  We are glad to serve you and happy to provide you with this summary of You can access your MyChart to more actively manage your health care and view more details from this visit by going to [email or web address].   If you've recently had a stay at the Hospital you can access your discharge instructions in [address] by gerardo

## (undated) NOTE — LETTER
June 5, 2020         Tj Shepardellyandreea Nate 60      Patient: Bal Sandoval   YOB: 1960   Date of Visit: 6/5/2020       Dear Dr. Bin Piña MD,    I saw your patient, Bal Sandoval, on 6/5/2020.  Enclose

## (undated) NOTE — LETTER
Date: 5/23/2024    Patient Name: Ryley Guzman          To Whom it may concern:    The above patient was seen at [facility] for treatment of a medical condition.    This patient should be excused from attending work/school.    The patient may return to work/school on 5/28/2024 without limitations.        Sincerely,    ASUNCION Crenshaw

## (undated) NOTE — LETTER
Date: 5/8/2024    Patient Name: Ryley Guzman          To Whom it may concern:    The above patient was seen at [facility] for treatment of a medical condition.    This patient should be excused from attending work/school..    The patient may return to work/school on 5/21/2024.        Sincerely,    ASUNCION Crenshaw